# Patient Record
Sex: MALE | Race: WHITE | NOT HISPANIC OR LATINO
[De-identification: names, ages, dates, MRNs, and addresses within clinical notes are randomized per-mention and may not be internally consistent; named-entity substitution may affect disease eponyms.]

---

## 2017-03-27 ENCOUNTER — APPOINTMENT (OUTPATIENT)
Dept: UROLOGY | Facility: CLINIC | Age: 62
End: 2017-03-27

## 2017-03-27 VITALS
WEIGHT: 194 LBS | HEART RATE: 83 BPM | DIASTOLIC BLOOD PRESSURE: 87 MMHG | BODY MASS INDEX: 29.4 KG/M2 | TEMPERATURE: 98.6 F | SYSTOLIC BLOOD PRESSURE: 131 MMHG | HEIGHT: 68 IN | OXYGEN SATURATION: 98 %

## 2017-04-07 ENCOUNTER — MEDICATION RENEWAL (OUTPATIENT)
Age: 62
End: 2017-04-07

## 2017-04-12 ENCOUNTER — RX RENEWAL (OUTPATIENT)
Age: 62
End: 2017-04-12

## 2017-04-24 LAB — PSA SERPL-MCNC: 1

## 2017-05-25 ENCOUNTER — MEDICATION RENEWAL (OUTPATIENT)
Age: 62
End: 2017-05-25

## 2017-05-30 ENCOUNTER — MEDICATION RENEWAL (OUTPATIENT)
Age: 62
End: 2017-05-30

## 2017-06-12 ENCOUNTER — APPOINTMENT (OUTPATIENT)
Dept: INTERNAL MEDICINE | Facility: CLINIC | Age: 62
End: 2017-06-12

## 2017-06-21 ENCOUNTER — OTHER (OUTPATIENT)
Age: 62
End: 2017-06-21

## 2017-06-21 LAB
CHOLEST SERPL-MCNC: 179
GLUCOSE SERPL-MCNC: 89
HDLC SERPL-MCNC: 65
LDLC SERPL CALC-MCNC: 103

## 2017-06-26 ENCOUNTER — APPOINTMENT (OUTPATIENT)
Dept: INTERNAL MEDICINE | Facility: CLINIC | Age: 62
End: 2017-06-26

## 2017-06-26 VITALS
HEART RATE: 86 BPM | OXYGEN SATURATION: 97 % | WEIGHT: 196 LBS | BODY MASS INDEX: 29.7 KG/M2 | HEIGHT: 68 IN | SYSTOLIC BLOOD PRESSURE: 126 MMHG | RESPIRATION RATE: 16 BRPM | DIASTOLIC BLOOD PRESSURE: 80 MMHG | TEMPERATURE: 98 F

## 2017-06-26 DIAGNOSIS — K58.1 IRRITABLE BOWEL SYNDROME WITH CONSTIPATION: ICD-10-CM

## 2017-06-26 DIAGNOSIS — F33.1 MAJOR DEPRESSIVE DISORDER, RECURRENT, MODERATE: ICD-10-CM

## 2017-06-26 DIAGNOSIS — N40.0 BENIGN PROSTATIC HYPERPLASIA WITHOUT LOWER URINARY TRACT SYMPMS: ICD-10-CM

## 2017-08-09 ENCOUNTER — MEDICATION RENEWAL (OUTPATIENT)
Age: 62
End: 2017-08-09

## 2017-08-28 ENCOUNTER — APPOINTMENT (OUTPATIENT)
Dept: INTERNAL MEDICINE | Facility: CLINIC | Age: 62
End: 2017-08-28
Payer: MEDICAID

## 2017-08-28 VITALS
DIASTOLIC BLOOD PRESSURE: 80 MMHG | TEMPERATURE: 98.4 F | BODY MASS INDEX: 30.77 KG/M2 | OXYGEN SATURATION: 98 % | SYSTOLIC BLOOD PRESSURE: 134 MMHG | WEIGHT: 203 LBS | HEIGHT: 68 IN | HEART RATE: 90 BPM | RESPIRATION RATE: 16 BRPM

## 2017-08-28 DIAGNOSIS — R74.0 NONSPECIFIC ELEVATION OF LEVELS OF TRANSAMINASE AND LACTIC ACID DEHYDROGENASE [LDH]: ICD-10-CM

## 2017-08-28 PROCEDURE — 99214 OFFICE O/P EST MOD 30 MIN: CPT

## 2017-09-25 ENCOUNTER — RX RENEWAL (OUTPATIENT)
Age: 62
End: 2017-09-25

## 2017-11-20 ENCOUNTER — RX RENEWAL (OUTPATIENT)
Age: 62
End: 2017-11-20

## 2017-12-18 ENCOUNTER — RESULT REVIEW (OUTPATIENT)
Age: 62
End: 2017-12-18

## 2017-12-26 ENCOUNTER — OUTPATIENT (OUTPATIENT)
Dept: OUTPATIENT SERVICES | Facility: HOSPITAL | Age: 62
LOS: 1 days | Discharge: HOME | End: 2017-12-26
Payer: MEDICAID

## 2017-12-26 DIAGNOSIS — K44.9 DIAPHRAGMATIC HERNIA WITHOUT OBSTRUCTION OR GANGRENE: ICD-10-CM

## 2017-12-26 PROCEDURE — 74176 CT ABD & PELVIS W/O CONTRAST: CPT | Mod: 26

## 2018-01-08 ENCOUNTER — APPOINTMENT (OUTPATIENT)
Dept: INTERNAL MEDICINE | Facility: CLINIC | Age: 63
End: 2018-01-08
Payer: MEDICAID

## 2018-01-08 VITALS
HEIGHT: 68 IN | DIASTOLIC BLOOD PRESSURE: 62 MMHG | RESPIRATION RATE: 16 BRPM | SYSTOLIC BLOOD PRESSURE: 108 MMHG | BODY MASS INDEX: 31.07 KG/M2 | TEMPERATURE: 97.8 F | OXYGEN SATURATION: 98 % | WEIGHT: 205 LBS | HEART RATE: 78 BPM

## 2018-01-08 DIAGNOSIS — F41.9 ANXIETY DISORDER, UNSPECIFIED: ICD-10-CM

## 2018-01-08 DIAGNOSIS — M19.90 UNSPECIFIED OSTEOARTHRITIS, UNSPECIFIED SITE: ICD-10-CM

## 2018-01-08 DIAGNOSIS — Z86.79 PERSONAL HISTORY OF OTHER DISEASES OF THE CIRCULATORY SYSTEM: ICD-10-CM

## 2018-01-08 DIAGNOSIS — N52.9 MALE ERECTILE DYSFUNCTION, UNSPECIFIED: ICD-10-CM

## 2018-01-08 PROCEDURE — 99214 OFFICE O/P EST MOD 30 MIN: CPT

## 2018-01-08 RX ORDER — CLOMIPRAMINE HYDROCHLORIDE 25 MG/1
25 CAPSULE ORAL
Qty: 30 | Refills: 0 | Status: ACTIVE | COMMUNITY
Start: 2017-07-07

## 2018-01-24 ENCOUNTER — OUTPATIENT (OUTPATIENT)
Dept: OUTPATIENT SERVICES | Facility: HOSPITAL | Age: 63
LOS: 1 days | Discharge: HOME | End: 2018-01-24
Payer: MEDICAID

## 2018-01-24 DIAGNOSIS — K44.9 DIAPHRAGMATIC HERNIA WITHOUT OBSTRUCTION OR GANGRENE: ICD-10-CM

## 2018-01-24 LAB
BUN SERPL-MCNC: 20 MG/DL — SIGNIFICANT CHANGE UP (ref 7–23)
CREAT SERPL-MCNC: 0.89 MG/DL — SIGNIFICANT CHANGE UP (ref 0.5–1.3)

## 2018-01-24 PROCEDURE — 74177 CT ABD & PELVIS W/CONTRAST: CPT | Mod: 26

## 2018-01-25 ENCOUNTER — OTHER (OUTPATIENT)
Age: 63
End: 2018-01-25

## 2018-01-25 LAB
CHOLEST SERPL-MCNC: 166
GLUCOSE SERPL-MCNC: 117
HDLC SERPL-MCNC: 49
LDLC SERPL CALC-MCNC: 78

## 2018-02-01 ENCOUNTER — APPOINTMENT (OUTPATIENT)
Dept: SURGERY | Facility: CLINIC | Age: 63
End: 2018-02-01

## 2018-02-08 ENCOUNTER — APPOINTMENT (OUTPATIENT)
Dept: SURGERY | Facility: CLINIC | Age: 63
End: 2018-02-08

## 2018-02-12 ENCOUNTER — APPOINTMENT (OUTPATIENT)
Dept: ORTHOPEDIC SURGERY | Facility: CLINIC | Age: 63
End: 2018-02-12
Payer: MEDICAID

## 2018-02-12 VITALS
DIASTOLIC BLOOD PRESSURE: 79 MMHG | HEIGHT: 68 IN | WEIGHT: 205 LBS | SYSTOLIC BLOOD PRESSURE: 135 MMHG | HEART RATE: 83 BPM | BODY MASS INDEX: 31.07 KG/M2

## 2018-02-12 PROCEDURE — 99204 OFFICE O/P NEW MOD 45 MIN: CPT

## 2018-02-27 ENCOUNTER — RX RENEWAL (OUTPATIENT)
Age: 63
End: 2018-02-27

## 2018-03-02 ENCOUNTER — RX RENEWAL (OUTPATIENT)
Age: 63
End: 2018-03-02

## 2018-03-07 ENCOUNTER — APPOINTMENT (OUTPATIENT)
Dept: SURGERY | Facility: CLINIC | Age: 63
End: 2018-03-07
Payer: MEDICAID

## 2018-03-07 VITALS
TEMPERATURE: 98.1 F | WEIGHT: 199 LBS | OXYGEN SATURATION: 98 % | SYSTOLIC BLOOD PRESSURE: 130 MMHG | HEIGHT: 68 IN | RESPIRATION RATE: 15 BRPM | DIASTOLIC BLOOD PRESSURE: 76 MMHG | BODY MASS INDEX: 30.16 KG/M2 | HEART RATE: 90 BPM

## 2018-03-07 DIAGNOSIS — Z80.0 FAMILY HISTORY OF MALIGNANT NEOPLASM OF DIGESTIVE ORGANS: ICD-10-CM

## 2018-03-07 DIAGNOSIS — E78.00 PURE HYPERCHOLESTEROLEMIA, UNSPECIFIED: ICD-10-CM

## 2018-03-07 DIAGNOSIS — Z80.1 FAMILY HISTORY OF MALIGNANT NEOPLASM OF TRACHEA, BRONCHUS AND LUNG: ICD-10-CM

## 2018-03-07 DIAGNOSIS — K42.9 UMBILICAL HERNIA W/OUT OBSTRUCTION OR GANGRENE: ICD-10-CM

## 2018-03-07 DIAGNOSIS — Z78.9 OTHER SPECIFIED HEALTH STATUS: ICD-10-CM

## 2018-03-07 PROCEDURE — 99204 OFFICE O/P NEW MOD 45 MIN: CPT

## 2018-03-07 RX ORDER — CHOLECALCIFEROL (VITAMIN D3) 25 MCG
TABLET ORAL
Refills: 0 | Status: ACTIVE | COMMUNITY

## 2018-03-07 RX ORDER — POLYETHYLENE GLYCOL-3350, SODIUM CHLORIDE, POTASSIUM CHLORIDE AND SODIUM BICARBONATE 420; 11.2; 5.72; 1.48 G/438.4G; G/438.4G; G/438.4G; G/438.4G
420 POWDER, FOR SOLUTION ORAL
Qty: 4000 | Refills: 0 | Status: DISCONTINUED | COMMUNITY
Start: 2017-08-21

## 2018-03-07 RX ORDER — OMEPRAZOLE 20 MG/1
20 CAPSULE, DELAYED RELEASE ORAL DAILY
Qty: 30 | Refills: 5 | Status: DISCONTINUED | COMMUNITY
End: 2018-03-07

## 2018-03-07 RX ORDER — BUSPIRONE HYDROCHLORIDE 10 MG/1
10 TABLET ORAL
Qty: 60 | Refills: 0 | Status: DISCONTINUED | COMMUNITY
Start: 2017-10-23

## 2018-03-07 RX ORDER — SILDENAFIL 20 MG/1
20 TABLET ORAL
Qty: 50 | Refills: 11 | Status: DISCONTINUED | COMMUNITY
Start: 2017-03-27 | End: 2018-03-07

## 2018-03-07 RX ORDER — BUSPIRONE HYDROCHLORIDE 15 MG/1
15 TABLET ORAL
Qty: 90 | Refills: 0 | Status: DISCONTINUED | COMMUNITY
Start: 2017-11-10

## 2018-03-07 RX ORDER — ASCORBIC ACID 500 MG
TABLET ORAL
Refills: 0 | Status: ACTIVE | COMMUNITY

## 2018-03-07 RX ORDER — FLUOXETINE HYDROCHLORIDE 20 MG/1
20 CAPSULE ORAL
Qty: 90 | Refills: 0 | Status: DISCONTINUED | COMMUNITY
Start: 2017-07-18 | End: 2018-03-07

## 2018-03-07 RX ORDER — IBUPROFEN 800 MG/1
800 TABLET, FILM COATED ORAL
Qty: 60 | Refills: 3 | Status: DISCONTINUED | COMMUNITY
End: 2018-03-07

## 2018-03-07 RX ORDER — FEXOFENADINE HCL 180 MG
180 TABLET ORAL
Refills: 0 | Status: ACTIVE | COMMUNITY

## 2018-03-09 PROBLEM — M25.512 LEFT SHOULDER PAIN: Status: ACTIVE | Noted: 2018-03-09

## 2018-03-12 ENCOUNTER — APPOINTMENT (OUTPATIENT)
Dept: ORTHOPEDIC SURGERY | Facility: CLINIC | Age: 63
End: 2018-03-12
Payer: MEDICAID

## 2018-03-12 VITALS
HEART RATE: 79 BPM | BODY MASS INDEX: 30.77 KG/M2 | WEIGHT: 203 LBS | HEIGHT: 68 IN | DIASTOLIC BLOOD PRESSURE: 84 MMHG | SYSTOLIC BLOOD PRESSURE: 141 MMHG

## 2018-03-12 DIAGNOSIS — M25.512 PAIN IN LEFT SHOULDER: ICD-10-CM

## 2018-03-12 DIAGNOSIS — M75.52 BURSITIS OF LEFT SHOULDER: ICD-10-CM

## 2018-03-12 PROCEDURE — 99215 OFFICE O/P EST HI 40 MIN: CPT | Mod: 25

## 2018-03-12 PROCEDURE — 73030 X-RAY EXAM OF SHOULDER: CPT | Mod: LT

## 2018-03-12 PROCEDURE — 20610 DRAIN/INJ JOINT/BURSA W/O US: CPT | Mod: LT

## 2018-03-15 ENCOUNTER — APPOINTMENT (OUTPATIENT)
Dept: PHYSICAL MEDICINE AND REHAB | Facility: CLINIC | Age: 63
End: 2018-03-15
Payer: MEDICAID

## 2018-03-15 VITALS
HEIGHT: 68 IN | WEIGHT: 203 LBS | HEART RATE: 79 BPM | SYSTOLIC BLOOD PRESSURE: 154 MMHG | BODY MASS INDEX: 30.77 KG/M2 | DIASTOLIC BLOOD PRESSURE: 86 MMHG

## 2018-03-15 PROCEDURE — 99204 OFFICE O/P NEW MOD 45 MIN: CPT

## 2018-03-16 ENCOUNTER — FORM ENCOUNTER (OUTPATIENT)
Age: 63
End: 2018-03-16

## 2018-03-17 ENCOUNTER — OUTPATIENT (OUTPATIENT)
Dept: OUTPATIENT SERVICES | Facility: HOSPITAL | Age: 63
LOS: 1 days | End: 2018-03-17
Payer: MEDICAID

## 2018-03-17 ENCOUNTER — APPOINTMENT (OUTPATIENT)
Dept: MRI IMAGING | Facility: CLINIC | Age: 63
End: 2018-03-17
Payer: MEDICAID

## 2018-03-17 DIAGNOSIS — M54.16 RADICULOPATHY, LUMBAR REGION: ICD-10-CM

## 2018-03-17 DIAGNOSIS — M53.3 SACROCOCCYGEAL DISORDERS, NOT ELSEWHERE CLASSIFIED: ICD-10-CM

## 2018-03-17 PROCEDURE — 72148 MRI LUMBAR SPINE W/O DYE: CPT

## 2018-03-17 PROCEDURE — 72148 MRI LUMBAR SPINE W/O DYE: CPT | Mod: 26

## 2018-03-20 ENCOUNTER — TRANSCRIPTION ENCOUNTER (OUTPATIENT)
Age: 63
End: 2018-03-20

## 2018-03-23 ENCOUNTER — APPOINTMENT (OUTPATIENT)
Dept: PHYSICAL MEDICINE AND REHAB | Facility: CLINIC | Age: 63
End: 2018-03-23
Payer: MEDICAID

## 2018-03-23 VITALS
BODY MASS INDEX: 30.77 KG/M2 | HEIGHT: 68 IN | OXYGEN SATURATION: 98 % | DIASTOLIC BLOOD PRESSURE: 99 MMHG | SYSTOLIC BLOOD PRESSURE: 164 MMHG | HEART RATE: 109 BPM | WEIGHT: 203 LBS

## 2018-03-23 PROCEDURE — 99213 OFFICE O/P EST LOW 20 MIN: CPT

## 2018-04-09 ENCOUNTER — APPOINTMENT (OUTPATIENT)
Dept: ORTHOPEDIC SURGERY | Facility: CLINIC | Age: 63
End: 2018-04-09
Payer: MEDICAID

## 2018-04-09 ENCOUNTER — APPOINTMENT (OUTPATIENT)
Dept: PHYSICAL MEDICINE AND REHAB | Facility: CLINIC | Age: 63
End: 2018-04-09

## 2018-04-09 VITALS
BODY MASS INDEX: 30.77 KG/M2 | HEIGHT: 68 IN | HEART RATE: 89 BPM | SYSTOLIC BLOOD PRESSURE: 150 MMHG | WEIGHT: 203 LBS | DIASTOLIC BLOOD PRESSURE: 84 MMHG

## 2018-04-09 DIAGNOSIS — M75.32 CALCIFIC TENDINITIS OF LEFT SHOULDER: ICD-10-CM

## 2018-04-09 PROCEDURE — 99214 OFFICE O/P EST MOD 30 MIN: CPT

## 2018-04-13 ENCOUNTER — FORM ENCOUNTER (OUTPATIENT)
Age: 63
End: 2018-04-13

## 2018-04-14 ENCOUNTER — OUTPATIENT (OUTPATIENT)
Dept: OUTPATIENT SERVICES | Facility: HOSPITAL | Age: 63
LOS: 1 days | End: 2018-04-14
Payer: MEDICAID

## 2018-04-14 ENCOUNTER — APPOINTMENT (OUTPATIENT)
Dept: MRI IMAGING | Facility: CLINIC | Age: 63
End: 2018-04-14
Payer: MEDICAID

## 2018-04-14 DIAGNOSIS — Z00.8 ENCOUNTER FOR OTHER GENERAL EXAMINATION: ICD-10-CM

## 2018-04-14 PROCEDURE — 73221 MRI JOINT UPR EXTREM W/O DYE: CPT

## 2018-04-14 PROCEDURE — 73221 MRI JOINT UPR EXTREM W/O DYE: CPT | Mod: 26,LT

## 2018-04-23 ENCOUNTER — APPOINTMENT (OUTPATIENT)
Dept: ORTHOPEDIC SURGERY | Facility: CLINIC | Age: 63
End: 2018-04-23
Payer: MEDICAID

## 2018-04-23 DIAGNOSIS — M67.912 UNSPECIFIED DISORDER OF SYNOVIUM AND TENDON, LEFT SHOULDER: ICD-10-CM

## 2018-04-23 DIAGNOSIS — M19.012 PRIMARY OSTEOARTHRITIS, LEFT SHOULDER: ICD-10-CM

## 2018-04-23 PROCEDURE — 99213 OFFICE O/P EST LOW 20 MIN: CPT

## 2018-05-08 ENCOUNTER — OUTPATIENT (OUTPATIENT)
Dept: OUTPATIENT SERVICES | Facility: HOSPITAL | Age: 63
LOS: 1 days | End: 2018-05-08
Payer: MEDICAID

## 2018-05-08 ENCOUNTER — APPOINTMENT (OUTPATIENT)
Dept: PHYSICAL MEDICINE AND REHAB | Facility: CLINIC | Age: 63
End: 2018-05-08
Payer: MEDICAID

## 2018-05-08 DIAGNOSIS — M54.17 RADICULOPATHY, LUMBOSACRAL REGION: ICD-10-CM

## 2018-05-08 DIAGNOSIS — M54.16 RADICULOPATHY, LUMBAR REGION: ICD-10-CM

## 2018-05-08 PROCEDURE — 62323 NJX INTERLAMINAR LMBR/SAC: CPT

## 2018-05-18 ENCOUNTER — MEDICATION RENEWAL (OUTPATIENT)
Age: 63
End: 2018-05-18

## 2018-05-21 ENCOUNTER — RX RENEWAL (OUTPATIENT)
Age: 63
End: 2018-05-21

## 2018-05-24 ENCOUNTER — APPOINTMENT (OUTPATIENT)
Dept: PHYSICAL MEDICINE AND REHAB | Facility: CLINIC | Age: 63
End: 2018-05-24

## 2018-06-04 ENCOUNTER — MEDICATION RENEWAL (OUTPATIENT)
Age: 63
End: 2018-06-04

## 2018-06-15 ENCOUNTER — APPOINTMENT (OUTPATIENT)
Dept: ORTHOPEDIC SURGERY | Facility: CLINIC | Age: 63
End: 2018-06-15
Payer: MEDICAID

## 2018-06-15 DIAGNOSIS — M25.562 PAIN IN LEFT KNEE: ICD-10-CM

## 2018-06-15 PROCEDURE — 73562 X-RAY EXAM OF KNEE 3: CPT | Mod: LT

## 2018-06-15 PROCEDURE — 99213 OFFICE O/P EST LOW 20 MIN: CPT

## 2018-06-15 RX ORDER — IBUPROFEN 800 MG/1
800 TABLET, FILM COATED ORAL
Qty: 60 | Refills: 1 | Status: ACTIVE | COMMUNITY
Start: 2018-06-15 | End: 1900-01-01

## 2018-06-22 ENCOUNTER — APPOINTMENT (OUTPATIENT)
Dept: PHYSICAL MEDICINE AND REHAB | Facility: CLINIC | Age: 63
End: 2018-06-22
Payer: MEDICAID

## 2018-06-22 DIAGNOSIS — M54.5 LOW BACK PAIN: ICD-10-CM

## 2018-06-22 PROCEDURE — 99213 OFFICE O/P EST LOW 20 MIN: CPT

## 2018-06-26 VITALS — HEIGHT: 68 IN | RESPIRATION RATE: 14 BRPM | HEART RATE: 90 BPM | WEIGHT: 203 LBS | BODY MASS INDEX: 30.77 KG/M2

## 2018-06-26 PROBLEM — M54.5 LOW BACK PAIN: Status: ACTIVE | Noted: 2018-02-12

## 2018-06-28 ENCOUNTER — APPOINTMENT (OUTPATIENT)
Dept: INTERNAL MEDICINE | Facility: CLINIC | Age: 63
End: 2018-06-28
Payer: MEDICAID

## 2018-06-28 VITALS
RESPIRATION RATE: 16 BRPM | DIASTOLIC BLOOD PRESSURE: 90 MMHG | BODY MASS INDEX: 30.77 KG/M2 | OXYGEN SATURATION: 96 % | HEART RATE: 102 BPM | SYSTOLIC BLOOD PRESSURE: 136 MMHG | HEIGHT: 68 IN | WEIGHT: 203 LBS | TEMPERATURE: 98.6 F

## 2018-06-28 DIAGNOSIS — Z87.39 PERSONAL HISTORY OF OTHER DISEASES OF THE MUSCULOSKELETAL SYSTEM AND CONNECTIVE TISSUE: ICD-10-CM

## 2018-06-28 DIAGNOSIS — R74.0 NONSPECIFIC ELEVATION OF LEVELS OF TRANSAMINASE AND LACTIC ACID DEHYDROGENASE [LDH]: ICD-10-CM

## 2018-06-28 DIAGNOSIS — Z86.39 PERSONAL HISTORY OF OTHER ENDOCRINE, NUTRITIONAL AND METABOLIC DISEASE: ICD-10-CM

## 2018-06-28 DIAGNOSIS — Z87.09 PERSONAL HISTORY OF OTHER DISEASES OF THE RESPIRATORY SYSTEM: ICD-10-CM

## 2018-06-28 DIAGNOSIS — K21.9 GASTRO-ESOPHAGEAL REFLUX DISEASE W/OUT ESOPHAGITIS: ICD-10-CM

## 2018-06-28 PROCEDURE — 99214 OFFICE O/P EST MOD 30 MIN: CPT

## 2018-06-28 RX ORDER — BUSPIRONE HYDROCHLORIDE 30 MG/1
30 TABLET ORAL
Qty: 60 | Refills: 0 | Status: DISCONTINUED | COMMUNITY
Start: 2017-12-04 | End: 2018-06-28

## 2018-06-28 RX ORDER — HYALURONATE SODIUM 20 MG/2 ML
20 SYRINGE (ML) INTRAARTICULAR
Qty: 1 | Refills: 0 | Status: DISCONTINUED | OUTPATIENT
Start: 2018-06-15 | End: 2018-06-28

## 2018-06-28 RX ORDER — BUPROPION HYDROCHLORIDE 150 MG/1
150 TABLET, EXTENDED RELEASE ORAL
Qty: 30 | Refills: 0 | Status: DISCONTINUED | COMMUNITY
Start: 2018-04-16 | End: 2018-06-28

## 2018-06-28 RX ORDER — SENNOSIDES 15 MG/1
TABLET, COATED ORAL
Refills: 0 | Status: ACTIVE | COMMUNITY

## 2018-06-28 RX ORDER — FLUVOXAMINE MALEATE 100 MG/1
100 TABLET, FILM COATED ORAL
Refills: 0 | Status: ACTIVE | COMMUNITY

## 2018-06-28 RX ORDER — IBUPROFEN 800 MG/1
800 TABLET, FILM COATED ORAL
Refills: 0 | Status: DISCONTINUED | COMMUNITY
End: 2018-06-28

## 2018-06-28 RX ORDER — BUSPIRONE HYDROCHLORIDE 30 MG/1
30 TABLET ORAL
Refills: 0 | Status: ACTIVE | COMMUNITY

## 2018-06-28 RX ORDER — ALBUTEROL SULFATE 90 UG/1
108 (90 BASE) AEROSOL, METERED RESPIRATORY (INHALATION)
Qty: 18 | Refills: 0 | Status: DISCONTINUED | COMMUNITY
Start: 2018-06-11 | End: 2018-06-28

## 2018-06-28 NOTE — HISTORY OF PRESENT ILLNESS
[FreeTextEntry1] : followup evaluation [de-identified] : Frank presents for followup evaluation. Feeling well. Denies any chest pain, shortness of breath or palpitations. No nocturnal symptoms of cough and dyspnea. He denies any hematuria dysuria. He continues on current medication regimen without any untoward side effects. Transferred to

## 2018-06-28 NOTE — PLAN
[FreeTextEntry1] : Frank will continue on her medication regimen. He has been given a prescription for comprehensive blood profile. We'll followup in 6 months for complete physical examination.

## 2018-07-09 ENCOUNTER — APPOINTMENT (OUTPATIENT)
Dept: ORTHOPEDIC SURGERY | Facility: CLINIC | Age: 63
End: 2018-07-09
Payer: MEDICAID

## 2018-07-09 ENCOUNTER — RX RENEWAL (OUTPATIENT)
Age: 63
End: 2018-07-09

## 2018-07-09 PROCEDURE — 20610 DRAIN/INJ JOINT/BURSA W/O US: CPT | Mod: LT

## 2018-07-16 ENCOUNTER — APPOINTMENT (OUTPATIENT)
Dept: ORTHOPEDIC SURGERY | Facility: CLINIC | Age: 63
End: 2018-07-16
Payer: MEDICAID

## 2018-07-16 PROCEDURE — 20610 DRAIN/INJ JOINT/BURSA W/O US: CPT | Mod: LT

## 2018-07-23 ENCOUNTER — APPOINTMENT (OUTPATIENT)
Dept: ORTHOPEDIC SURGERY | Facility: CLINIC | Age: 63
End: 2018-07-23

## 2018-07-31 ENCOUNTER — OUTPATIENT (OUTPATIENT)
Dept: OUTPATIENT SERVICES | Facility: HOSPITAL | Age: 63
LOS: 1 days | End: 2018-07-31
Payer: MEDICAID

## 2018-07-31 ENCOUNTER — APPOINTMENT (OUTPATIENT)
Dept: PHYSICAL MEDICINE AND REHAB | Facility: CLINIC | Age: 63
End: 2018-07-31
Payer: MEDICAID

## 2018-07-31 DIAGNOSIS — M54.18 RADICULOPATHY, SACRAL AND SACROCOCCYGEAL REGION: ICD-10-CM

## 2018-07-31 PROCEDURE — 62323 NJX INTERLAMINAR LMBR/SAC: CPT

## 2018-08-10 ENCOUNTER — APPOINTMENT (OUTPATIENT)
Dept: PHYSICAL MEDICINE AND REHAB | Facility: CLINIC | Age: 63
End: 2018-08-10
Payer: MEDICAID

## 2018-08-10 VITALS
BODY MASS INDEX: 30.77 KG/M2 | HEART RATE: 91 BPM | SYSTOLIC BLOOD PRESSURE: 122 MMHG | DIASTOLIC BLOOD PRESSURE: 81 MMHG | WEIGHT: 203 LBS | HEIGHT: 68 IN

## 2018-08-10 PROCEDURE — 99213 OFFICE O/P EST LOW 20 MIN: CPT

## 2018-08-13 ENCOUNTER — RX RENEWAL (OUTPATIENT)
Age: 63
End: 2018-08-13

## 2018-08-21 ENCOUNTER — RX RENEWAL (OUTPATIENT)
Age: 63
End: 2018-08-21

## 2018-08-21 DIAGNOSIS — T75.3XXA MOTION SICKNESS, INITIAL ENCOUNTER: ICD-10-CM

## 2018-08-21 RX ORDER — SCOPOLAMINE 1.5 MG/1
1 PATCH, EXTENDED RELEASE TRANSDERMAL
Qty: 3 | Refills: 0 | Status: ACTIVE | COMMUNITY
Start: 2018-08-21 | End: 1900-01-01

## 2018-08-29 ENCOUNTER — RX RENEWAL (OUTPATIENT)
Age: 63
End: 2018-08-29

## 2018-08-30 ENCOUNTER — RX RENEWAL (OUTPATIENT)
Age: 63
End: 2018-08-30

## 2018-10-03 ENCOUNTER — RX RENEWAL (OUTPATIENT)
Age: 63
End: 2018-10-03

## 2018-10-03 ENCOUNTER — MEDICATION RENEWAL (OUTPATIENT)
Age: 63
End: 2018-10-03

## 2018-10-03 RX ORDER — FLUTICASONE PROPIONATE 50 UG/1
50 SPRAY, METERED NASAL DAILY
Qty: 48 | Refills: 1 | Status: ACTIVE | COMMUNITY
Start: 2018-10-03 | End: 1900-01-01

## 2018-10-04 ENCOUNTER — APPOINTMENT (OUTPATIENT)
Dept: PHYSICAL MEDICINE AND REHAB | Facility: CLINIC | Age: 63
End: 2018-10-04

## 2018-10-04 VITALS
DIASTOLIC BLOOD PRESSURE: 95 MMHG | SYSTOLIC BLOOD PRESSURE: 153 MMHG | HEIGHT: 68 IN | WEIGHT: 203 LBS | BODY MASS INDEX: 30.77 KG/M2 | HEART RATE: 84 BPM

## 2018-10-11 ENCOUNTER — APPOINTMENT (OUTPATIENT)
Dept: PHYSICAL MEDICINE AND REHAB | Facility: CLINIC | Age: 63
End: 2018-10-11

## 2018-10-11 ENCOUNTER — APPOINTMENT (OUTPATIENT)
Dept: PHYSICAL MEDICINE AND REHAB | Facility: CLINIC | Age: 63
End: 2018-10-11
Payer: MEDICAID

## 2018-10-11 VITALS
HEART RATE: 82 BPM | HEIGHT: 68 IN | DIASTOLIC BLOOD PRESSURE: 83 MMHG | WEIGHT: 203 LBS | BODY MASS INDEX: 30.77 KG/M2 | SYSTOLIC BLOOD PRESSURE: 138 MMHG

## 2018-10-11 DIAGNOSIS — M54.16 RADICULOPATHY, LUMBAR REGION: ICD-10-CM

## 2018-10-11 PROCEDURE — 99214 OFFICE O/P EST MOD 30 MIN: CPT

## 2018-10-15 PROBLEM — M54.16 LUMBAR RADICULOPATHY: Status: ACTIVE | Noted: 2018-03-15

## 2018-10-24 ENCOUNTER — FORM ENCOUNTER (OUTPATIENT)
Age: 63
End: 2018-10-24

## 2018-10-25 ENCOUNTER — APPOINTMENT (OUTPATIENT)
Dept: MRI IMAGING | Facility: CLINIC | Age: 63
End: 2018-10-25
Payer: MEDICAID

## 2018-10-25 ENCOUNTER — OUTPATIENT (OUTPATIENT)
Dept: OUTPATIENT SERVICES | Facility: HOSPITAL | Age: 63
LOS: 1 days | End: 2018-10-25
Payer: MEDICAID

## 2018-10-25 DIAGNOSIS — Z00.8 ENCOUNTER FOR OTHER GENERAL EXAMINATION: ICD-10-CM

## 2018-10-25 PROCEDURE — 72148 MRI LUMBAR SPINE W/O DYE: CPT | Mod: 26

## 2018-10-25 PROCEDURE — 72148 MRI LUMBAR SPINE W/O DYE: CPT

## 2018-10-26 ENCOUNTER — MOBILE ON CALL (OUTPATIENT)
Age: 63
End: 2018-10-26

## 2018-11-14 ENCOUNTER — RX RENEWAL (OUTPATIENT)
Age: 63
End: 2018-11-14

## 2018-11-14 RX ORDER — IBUPROFEN 600 MG/1
600 TABLET, FILM COATED ORAL 3 TIMES DAILY
Qty: 60 | Refills: 0 | Status: ACTIVE | COMMUNITY
Start: 2018-11-14 | End: 1900-01-01

## 2018-11-27 ENCOUNTER — APPOINTMENT (OUTPATIENT)
Dept: PHYSICAL MEDICINE AND REHAB | Facility: CLINIC | Age: 63
End: 2018-11-27
Payer: MEDICAID

## 2018-11-27 ENCOUNTER — OUTPATIENT (OUTPATIENT)
Dept: OUTPATIENT SERVICES | Facility: HOSPITAL | Age: 63
LOS: 1 days | End: 2018-11-27
Payer: MEDICAID

## 2018-11-27 DIAGNOSIS — M54.16 RADICULOPATHY, LUMBAR REGION: ICD-10-CM

## 2018-11-27 DIAGNOSIS — M46.1 SACROILIITIS, NOT ELSEWHERE CLASSIFIED: ICD-10-CM

## 2018-11-27 PROCEDURE — G0260: CPT

## 2018-11-27 PROCEDURE — 27096 INJECT SACROILIAC JOINT: CPT

## 2018-11-30 ENCOUNTER — APPOINTMENT (OUTPATIENT)
Dept: PHYSICAL MEDICINE AND REHAB | Facility: CLINIC | Age: 63
End: 2018-11-30

## 2018-12-13 LAB — HBA1C MFR BLD HPLC: 5.3

## 2018-12-14 ENCOUNTER — APPOINTMENT (OUTPATIENT)
Dept: PHYSICAL MEDICINE AND REHAB | Facility: CLINIC | Age: 63
End: 2018-12-14
Payer: MEDICAID

## 2018-12-14 VITALS
DIASTOLIC BLOOD PRESSURE: 88 MMHG | HEIGHT: 68 IN | WEIGHT: 203 LBS | SYSTOLIC BLOOD PRESSURE: 142 MMHG | BODY MASS INDEX: 30.77 KG/M2

## 2018-12-14 DIAGNOSIS — M53.3 SACROCOCCYGEAL DISORDERS, NOT ELSEWHERE CLASSIFIED: ICD-10-CM

## 2018-12-14 PROCEDURE — 99213 OFFICE O/P EST LOW 20 MIN: CPT

## 2018-12-15 PROBLEM — M53.3 COCCYXDYNIA: Status: ACTIVE | Noted: 2018-03-15

## 2018-12-15 PROBLEM — M53.3 SACROILIAC DYSFUNCTION: Status: ACTIVE | Noted: 2018-10-11

## 2018-12-20 ENCOUNTER — APPOINTMENT (OUTPATIENT)
Dept: INTERNAL MEDICINE | Facility: CLINIC | Age: 63
End: 2018-12-20
Payer: MEDICAID

## 2018-12-20 ENCOUNTER — OTHER (OUTPATIENT)
Age: 63
End: 2018-12-20

## 2018-12-20 VITALS
RESPIRATION RATE: 18 BRPM | HEIGHT: 68 IN | SYSTOLIC BLOOD PRESSURE: 122 MMHG | BODY MASS INDEX: 30.62 KG/M2 | DIASTOLIC BLOOD PRESSURE: 86 MMHG | HEART RATE: 101 BPM | OXYGEN SATURATION: 95 % | TEMPERATURE: 98.5 F | WEIGHT: 202 LBS

## 2018-12-20 DIAGNOSIS — F41.9 ANXIETY DISORDER, UNSPECIFIED: ICD-10-CM

## 2018-12-20 DIAGNOSIS — J30.2 OTHER ALLERGIC RHINITIS: ICD-10-CM

## 2018-12-20 DIAGNOSIS — K21.9 GASTRO-ESOPHAGEAL REFLUX DISEASE W/OUT ESOPHAGITIS: ICD-10-CM

## 2018-12-20 DIAGNOSIS — E78.5 HYPERLIPIDEMIA, UNSPECIFIED: ICD-10-CM

## 2018-12-20 DIAGNOSIS — Z60.2 PROBLEMS RELATED TO LIVING ALONE: ICD-10-CM

## 2018-12-20 DIAGNOSIS — J30.89 OTHER ALLERGIC RHINITIS: ICD-10-CM

## 2018-12-20 DIAGNOSIS — Z23 ENCOUNTER FOR IMMUNIZATION: ICD-10-CM

## 2018-12-20 DIAGNOSIS — F32.9 ANXIETY DISORDER, UNSPECIFIED: ICD-10-CM

## 2018-12-20 DIAGNOSIS — Z78.9 OTHER SPECIFIED HEALTH STATUS: ICD-10-CM

## 2018-12-20 PROCEDURE — G0009: CPT

## 2018-12-20 PROCEDURE — 90732 PPSV23 VACC 2 YRS+ SUBQ/IM: CPT | Mod: GA

## 2018-12-20 PROCEDURE — 99214 OFFICE O/P EST MOD 30 MIN: CPT | Mod: 25

## 2018-12-20 RX ORDER — AZELASTINE HYDROCHLORIDE 137 UG/1
0.1 SPRAY, METERED NASAL
Qty: 30 | Refills: 4 | Status: DISCONTINUED | COMMUNITY
Start: 2017-09-25 | End: 2018-12-20

## 2018-12-20 SDOH — SOCIAL STABILITY - SOCIAL INSECURITY: PROBLEMS RELATED TO LIVING ALONE: Z60.2

## 2018-12-20 NOTE — HISTORY OF PRESENT ILLNESS
[FreeTextEntry1] : Followup [de-identified] : Frank presents for followup evaluation. He is feeling well. He continues to have some symptoms of depression however he denies suicidal ideation. Patient denies any chest pain, shortness of breath palpitations. No hematuria or dysuria.

## 2018-12-20 NOTE — PLAN
[FreeTextEntry1] : Comprehensive blood profile was reviewed with patient. He will continue on current medication regimen. Patient will followup in 6 months with repeat lipid profile and complete metabolic profile. Frank will receive the pneumococcal vaccine.

## 2019-01-09 ENCOUNTER — RESULT REVIEW (OUTPATIENT)
Age: 64
End: 2019-01-09

## 2019-02-14 ENCOUNTER — RX RENEWAL (OUTPATIENT)
Age: 64
End: 2019-02-14

## 2019-02-21 ENCOUNTER — MEDICATION RENEWAL (OUTPATIENT)
Age: 64
End: 2019-02-21

## 2019-02-21 RX ORDER — OMEPRAZOLE 20 MG/1
20 CAPSULE, DELAYED RELEASE ORAL
Qty: 30 | Refills: 0 | Status: ACTIVE | COMMUNITY
Start: 2018-08-13 | End: 1900-01-01

## 2019-02-21 RX ORDER — AZELASTINE HYDROCHLORIDE 137 UG/1
137 SPRAY, METERED NASAL
Qty: 1 | Refills: 0 | Status: ACTIVE | COMMUNITY
Start: 2018-08-29 | End: 1900-01-01

## 2019-02-21 RX ORDER — SIMVASTATIN 40 MG/1
40 TABLET, FILM COATED ORAL
Qty: 30 | Refills: 0 | Status: ACTIVE | COMMUNITY
Start: 2017-09-20 | End: 1900-01-01

## 2019-06-28 ENCOUNTER — APPOINTMENT (OUTPATIENT)
Dept: INTERNAL MEDICINE | Facility: CLINIC | Age: 64
End: 2019-06-28

## 2019-07-23 ENCOUNTER — TRANSCRIPTION ENCOUNTER (OUTPATIENT)
Age: 64
End: 2019-07-23

## 2019-10-02 PROBLEM — Z60.2 PERSON LIVING ALONE: Status: ACTIVE | Noted: 2018-04-09

## 2020-08-06 ENCOUNTER — APPOINTMENT (OUTPATIENT)
Dept: DERMATOLOGY | Facility: CLINIC | Age: 65
End: 2020-08-06
Payer: COMMERCIAL

## 2020-08-06 VITALS — TEMPERATURE: 96.1 F

## 2020-08-06 DIAGNOSIS — Z87.2 PERSONAL HISTORY OF DISEASES OF THE SKIN AND SUBCUTANEOUS TISSUE: ICD-10-CM

## 2020-08-06 DIAGNOSIS — Z85.828 PERSONAL HISTORY OF OTHER MALIGNANT NEOPLASM OF SKIN: ICD-10-CM

## 2020-08-06 DIAGNOSIS — Z77.22 CONTACT WITH AND (SUSPECTED) EXPOSURE TO ENVIRONMENTAL TOBACCO SMOKE (ACUTE) (CHRONIC): ICD-10-CM

## 2020-08-06 PROCEDURE — 99202 OFFICE O/P NEW SF 15 MIN: CPT | Mod: GC

## 2020-08-06 RX ORDER — CLOBETASOL PROPIONATE 0.5 MG/G
0.05 OINTMENT TOPICAL
Qty: 1 | Refills: 0 | Status: ACTIVE | COMMUNITY
Start: 2020-08-06 | End: 1900-01-01

## 2020-08-10 PROBLEM — Z85.828 HISTORY OF MALIGNANT NEOPLASM OF SKIN: Status: INACTIVE | Noted: 2020-08-10

## 2020-08-10 PROBLEM — Z87.2 HISTORY OF ECZEMA: Status: INACTIVE | Noted: 2020-08-10

## 2020-08-10 PROBLEM — Z87.2 HISTORY OF PSORIASIS: Status: INACTIVE | Noted: 2020-08-10

## 2020-08-10 PROBLEM — Z77.22 SECONDHAND SMOKE EXPOSURE: Status: ACTIVE | Noted: 2020-08-06

## 2020-09-11 ENCOUNTER — APPOINTMENT (OUTPATIENT)
Dept: ORTHOPEDIC SURGERY | Facility: CLINIC | Age: 65
End: 2020-09-11
Payer: MEDICARE

## 2020-09-11 VITALS
BODY MASS INDEX: 31.07 KG/M2 | TEMPERATURE: 97.5 F | HEIGHT: 68 IN | SYSTOLIC BLOOD PRESSURE: 107 MMHG | WEIGHT: 205 LBS | HEART RATE: 88 BPM | DIASTOLIC BLOOD PRESSURE: 71 MMHG

## 2020-09-11 DIAGNOSIS — M16.12 UNILATERAL PRIMARY OSTEOARTHRITIS, LEFT HIP: ICD-10-CM

## 2020-09-11 PROCEDURE — 72170 X-RAY EXAM OF PELVIS: CPT

## 2020-09-11 PROCEDURE — 73562 X-RAY EXAM OF KNEE 3: CPT | Mod: LT

## 2020-09-11 PROCEDURE — 99213 OFFICE O/P EST LOW 20 MIN: CPT

## 2020-09-24 ENCOUNTER — APPOINTMENT (OUTPATIENT)
Dept: ORTHOPEDIC SURGERY | Facility: CLINIC | Age: 65
End: 2020-09-24
Payer: MEDICARE

## 2020-09-24 VITALS
BODY MASS INDEX: 31.07 KG/M2 | SYSTOLIC BLOOD PRESSURE: 151 MMHG | WEIGHT: 205 LBS | DIASTOLIC BLOOD PRESSURE: 91 MMHG | HEART RATE: 74 BPM | HEIGHT: 68 IN

## 2020-09-24 PROCEDURE — 73564 X-RAY EXAM KNEE 4 OR MORE: CPT | Mod: LT

## 2020-09-24 PROCEDURE — 72190 X-RAY EXAM OF PELVIS: CPT

## 2020-09-24 PROCEDURE — 99214 OFFICE O/P EST MOD 30 MIN: CPT

## 2020-09-24 NOTE — PHYSICAL EXAM
[de-identified] : On general examination the patient is adequately groomed and nourished. The vital parameters are as recorded. \par There is no lymphedema or diffuse swelling, no varicose veins, no skin warmth/erythema/scars/swelling, no ulcers and no palpable lymph nodes or masses in both lower extremities. Bilateral pedal pulses are well palpable.\par Upper Extremity:\par Both right and left upper extremities are unremarkable in terms of skin rash, lesions, pigmentation, redness, tenderness, swelling, joint instability, abnormal deformity or crepitus. The overall range of motion, sensation, motor tone and strength testing are normal.\par  \par Knee Exam\par The gait is left stiff knee antalgic.\par Knee alignment:         mild varus. \par Both knees demonstrate no scars and the skin has no warmth, erythema, swelling or tenderness. \par Both knees have a range of motion of\par Extension:                    Right 0 degrees             Left 0 degrees\par Flexion:                                   Right 125 degrees          Left 120 degrees\par there is pain with ROM of the left knee. \par Left Knee: There is medial joint line tenderness. There is mild effusion. \par Dylan's test is positive. Padmini test is positive.\par Lachman's test, Anterior/Posterior Drawer test and Pivot Shift Tests are negative. \par There is left knee grade 1 MCL mediolateral laxity and no anteroposterior instability. \par Patella compression test is negative and patellofemoral tracking is normal with no lateral subluxation, apprehension or instability. \par Right knee quadriceps and hamstrings power is 5. Right Knee Exam is normal.\par Left knee quadriceps and hamstrings power is 4+.\par \par Hip Exam:\par The gait and station is normal\par The patient has equal leg lengths and no pelvic tilt. Jerry/Opal test is 7 inches on the right and 7 inches on the left. Active SLR is 60 degrees on the right and 60 degrees on the left. The right hip demonstrates no scars and the skin has no signs of inflammation or tenderness. The left thigh demonstrates well healed scar from prior ORIF femur. \par Both Hips have a normal range of motion of flexion to 100 degrees, abduction 40 degrees, adduction 20 degrees, external rotation 40 degrees, internal rotation 20 degrees with symmetrical motion in flexion and extension. There is no flexion contracture, deformity or instability. Labral impingement tests are negative.\par Both hips flexor, abductor and extensor power is normal.\par \par Spine Exam:\par There is mild curvature of the spine with loss of normal lumbar lordosis. The skin is devoid of erythema, scars, pigmentation or rashes. There is mild lumbar spasm and tenderness especially at L4-L5 or L5-S1. \par The range of motion of the lumbar spine is limited by pain and spasm. Forward flexion is 80% normal, extension catch positive, lateral flexion and rotation 80% normal. There is no lumbar spine imbalance or instability detected.\par Bilateral passive SLR is right 40 degrees, left 40 degrees in supine and sitting positions. Lasegue's Test is negative.\par Neurology:\par The patient is alert and oriented in person, place and time. The mood is calm and affect is normal.\par Testing for coordination including Rhomberg's Test and Finger-Nose Test, sensation, motor tone and power and deep tendon reflexes in both lower extremities is normal.\par  [de-identified] : The following radiographs were ordered and read by me during this patients visit. I reviewed each radiograph in detail with the patient and discussed the findings as highlighted below. \par AP, lateral, Dinh and skyline views of the left knee confirm advanced degenerative joint disease with medial joint space narrowing and osteophyte formation\par AP view of the pelvis reveal preservation  of the joint space, with bony changes of the femur secondary to previous fracture. \par \par

## 2020-09-24 NOTE — DISCUSSION/SUMMARY
[de-identified] : Left knee DJD\par The natural history and treatment of degenerative arthritis was discussed with the patient at length today. The spectrum of treatment including nonoperative modalities to surgical intervention was elucidated. Noninvasive and nonoperative treatment modalities include weight reduction, activity modification with low impact exercise,  as needed use of acetaminophen or anti-inflammatory medications if tolerated, glucosamine/chondroitin supplements, and physical therapy. Further treatments can include corticosteroid injection and the use of viscosupplementation with hyaluronic acid injections. Definitive surgical treatment can certainly include total joint arthroplasty also. The risks and benefits of each treatment options was discussed and all questions were answered.\par The patient was informed of the findings and recommended conservative management in the form of a home exercise program, activity modifications, stationary bicycling, swimming and weight loss program. A trial of Glucosamine and Chondroiten Sulphate was recommended.\par A prescription for a course of physical therapy was provided.\par A prescription for non-steroidal anti-inflammatory medication - diclofenac was provided and the risks, benefits and side effects were discussed.\par Follow-up appointment was recommended in 3-6 months. \par \par \par
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
## 2020-09-29 ENCOUNTER — APPOINTMENT (OUTPATIENT)
Dept: ORTHOPEDIC SURGERY | Facility: CLINIC | Age: 65
End: 2020-09-29
Payer: SUBSIDIZED

## 2020-09-29 VITALS
HEART RATE: 85 BPM | OXYGEN SATURATION: 95 % | WEIGHT: 216 LBS | BODY MASS INDEX: 32.74 KG/M2 | HEIGHT: 68 IN | DIASTOLIC BLOOD PRESSURE: 87 MMHG | SYSTOLIC BLOOD PRESSURE: 135 MMHG

## 2020-09-29 PROCEDURE — 99213 OFFICE O/P EST LOW 20 MIN: CPT | Mod: NC
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
## 2020-09-29 NOTE — DISCUSSION/SUMMARY
[de-identified] : Left knee DJD\par The natural history and treatment of degenerative arthritis was discussed with the patient at length today. The spectrum of treatment including nonoperative modalities to surgical intervention was elucidated. Noninvasive and nonoperative treatment modalities include weight reduction, activity modification with low impact exercise,  as needed use of acetaminophen or anti-inflammatory medications if tolerated, glucosamine/chondroitin supplements, and physical therapy. Further treatments can include corticosteroid injection and the use of viscosupplementation with hyaluronic acid injections. Definitive surgical treatment can certainly include total joint arthroplasty also. The risks and benefits of each treatment options was discussed and all questions were answered.\par The patient was consented today for the [facility] HA/PRP clincal trial, and will have lab work as necessary as per trial protocols. \par He will follow up for his next trial visit next week.

## 2020-09-29 NOTE — PHYSICAL EXAM
[de-identified] : On general examination the patient is adequately groomed and nourished. The vital parameters are as recorded. \par There is no lymphedema or diffuse swelling, no varicose veins, no skin warmth/erythema/scars/swelling, no ulcers and no palpable lymph nodes or masses in both lower extremities. Bilateral pedal pulses are well palpable.\par Upper Extremity:\par Both right and left upper extremities are unremarkable in terms of skin rash, lesions, pigmentation, redness, tenderness, swelling, joint instability, abnormal deformity or crepitus. The overall range of motion, sensation, motor tone and strength testing are normal.\par  \par Knee Exam\par The gait is left stiff knee antalgic.\par Knee alignment:        Varus 6 degrees left knee. \par Both knees demonstrate no scars and the skin has no warmth, erythema, swelling or tenderness. \par Both knees have a range of motion of\par Extension:                    Right 0 degrees             Left 0 degrees\par Flexion:                                   Right 125 degrees          Left 120 degrees\par there is pain with ROM of the left knee. \par Left Knee: There is medial joint line tenderness. There is no effusion. \par Dylan's test is positive. Padmini test is positive.\par Lachman's test, Anterior/Posterior Drawer test and Pivot Shift Tests are negative. \par There is left knee grade 1 MCL mediolateral laxity and no anteroposterior instability. \par Patella compression test is negative and patellofemoral tracking is normal with no lateral subluxation, apprehension or instability. \par Right knee quadriceps and hamstrings power is 5. Right Knee Exam is normal.\par Left knee quadriceps and hamstrings power is 4+.\par \par Hip Exam:\par The gait and station is normal\par The patient has equal leg lengths and no pelvic tilt. Jerry/Opal test is 7 inches on the right and 7 inches on the left. Active SLR is 60 degrees on the right and 60 degrees on the left. The right hip demonstrates no scars and the skin has no signs of inflammation or tenderness. The left thigh demonstrates well healed scar from prior ORIF femur. \par Both Hips have a normal range of motion of flexion to 100 degrees, abduction 40 degrees, adduction 20 degrees, external rotation 40 degrees, internal rotation 20 degrees with symmetrical motion in flexion and extension. There is no flexion contracture, deformity or instability. Labral impingement tests are negative.\par Both hips flexor, abductor and extensor power is normal.\par \par Spine Exam:\par There is mild curvature of the spine with loss of normal lumbar lordosis. The skin is devoid of erythema, scars, pigmentation or rashes. There is mild lumbar spasm and tenderness especially at L4-L5 or L5-S1. \par The range of motion of the lumbar spine is limited by pain and spasm. Forward flexion is 80% normal, extension catch positive, lateral flexion and rotation 80% normal. There is no lumbar spine imbalance or instability detected.\par Bilateral passive SLR is right 40 degrees, left 40 degrees in supine and sitting positions. Lasegue's Test is negative.\par Neurology:\par The patient is alert and oriented in person, place and time. The mood is calm and affect is normal.\par Testing for coordination including Rhomberg's Test and Finger-Nose Test, sensation, motor tone and power and deep tendon reflexes in both lower extremities is normal.\par  [de-identified] : The following radiographs were reviewed and read by me during this patients visit. I reviewed each radiograph in detail with the patient and discussed the findings as highlighted below. \par AP, lateral, Dinh and skyline views of the left knee confirm advanced degenerative joint disease with medial joint space narrowing and osteophyte formation\par AP view of the pelvis reveal preservation of the joint space, with bony changes of the femur secondary to previous fracture. \par \par

## 2020-10-08 ENCOUNTER — APPOINTMENT (OUTPATIENT)
Dept: ORTHOPEDIC SURGERY | Facility: CLINIC | Age: 65
End: 2020-10-08
Payer: MEDICARE

## 2020-10-08 VITALS
DIASTOLIC BLOOD PRESSURE: 77 MMHG | TEMPERATURE: 97.6 F | HEART RATE: 96 BPM | SYSTOLIC BLOOD PRESSURE: 125 MMHG | BODY MASS INDEX: 32.74 KG/M2 | HEIGHT: 68 IN | WEIGHT: 216 LBS

## 2020-10-08 PROCEDURE — 20610 DRAIN/INJ JOINT/BURSA W/O US: CPT | Mod: NC

## 2020-10-08 PROCEDURE — 99213 OFFICE O/P EST LOW 20 MIN: CPT | Mod: NC,25

## 2020-10-08 PROCEDURE — ZZZZZ: CPT

## 2020-10-08 NOTE — PHYSICAL EXAM
[de-identified] : On general examination the patient is adequately groomed and nourished. The vital parameters are as recorded. \par There is no lymphedema or diffuse swelling, no varicose veins, no skin warmth/erythema/scars/swelling, no ulcers and no palpable lymph nodes or masses in both lower extremities. Bilateral pedal pulses are well palpable.\par Upper Extremity:\par Both right and left upper extremities are unremarkable in terms of skin rash, lesions, pigmentation, redness, tenderness, swelling, joint instability, abnormal deformity or crepitus. The overall range of motion, sensation, motor tone and strength testing are normal.\par  \par Knee Exam\par The gait is left stiff knee antalgic.\par Knee alignment:        Varus 6 degrees left knee. \par Both knees demonstrate no scars and the skin has no warmth, erythema, swelling or tenderness. \par Both knees have a range of motion of\par Extension:                    Right 0 degrees             Left 0 degrees\par Flexion:                                   Right 125 degrees          Left 120 degrees\par there is pain with ROM of the left knee. \par Left Knee: There is medial joint line tenderness. There is no effusion. \par Dylan's test is positive. Padmini test is positive.\par Lachman's test, Anterior/Posterior Drawer test and Pivot Shift Tests are negative. \par There is left knee grade 1 MCL mediolateral laxity and no anteroposterior instability. \par Patella compression test is negative and patellofemoral tracking is normal with no lateral subluxation, apprehension or instability. \par Right knee quadriceps and hamstrings power is 5. Right Knee Exam is normal.\par Left knee quadriceps and hamstrings power is 4+.\par \par Hip Exam:\par The gait and station is normal\par The patient has equal leg lengths and no pelvic tilt. Jerry/Opal test is 7 inches on the right and 7 inches on the left. Active SLR is 60 degrees on the right and 60 degrees on the left. The right hip demonstrates no scars and the skin has no signs of inflammation or tenderness. The left thigh demonstrates well healed scar from prior ORIF femur. \par Both Hips have a normal range of motion of flexion to 100 degrees, abduction 40 degrees, adduction 20 degrees, external rotation 40 degrees, internal rotation 20 degrees with symmetrical motion in flexion and extension. There is no flexion contracture, deformity or instability. Labral impingement tests are negative.\par Both hips flexor, abductor and extensor power is normal.\par \par Spine Exam:\par There is mild curvature of the spine with loss of normal lumbar lordosis. The skin is devoid of erythema, scars, pigmentation or rashes. There is mild lumbar spasm and tenderness especially at L4-L5 or L5-S1. \par The range of motion of the lumbar spine is limited by pain and spasm. Forward flexion is 80% normal, extension catch positive, lateral flexion and rotation 80% normal. There is no lumbar spine imbalance or instability detected.\par Bilateral passive SLR is right 40 degrees, left 40 degrees in supine and sitting positions. Lasegue's Test is negative.\par Neurology:\par The patient is alert and oriented in person, place and time. The mood is calm and affect is normal.\par Testing for coordination including Rhomberg's Test and Finger-Nose Test, sensation, motor tone and power and deep tendon reflexes in both lower extremities is normal.\par

## 2020-10-08 NOTE — PROCEDURE
[de-identified] : Discussed at length with the patient the planned injection.  The risks, benefits, convalescence and alternatives were reviewed. The possible side effects discussed included but were not limited to: pain, swelling, heat and redness. There symptoms are generally mild but if they are extensive then contact the office. Giving pain relievers by mouth such as NSAID’s or Tylenol can generally treat the reactions to intraarticular injections. Rare cases of infection have been noted. \par \par Following this discussion, the left knee was prepped with alcohol and Betadine swab stick, and under sterile condition the  injection (HA/PRP/Placebo) was performed with a 21 gauge needle. The needle was introduced into the joint, aspiration was performed to ensure intra-articular placement and the medication was injected. Upon withdrawal of the needle the site was cleaned with alcohol and a bandaid applied. The patient tolerated the injection well and there were no adverse effects. Post injection instructions included no strenuous activity for 24 hours, cryotherapy and if there are any adverse effects to contact the office.\par

## 2020-10-08 NOTE — DISCUSSION/SUMMARY
[de-identified] : Left knee DJD\par The natural history and treatment of degenerative arthritis was discussed with the patient at length today. The spectrum of treatment including nonoperative modalities to surgical intervention was elucidated. Noninvasive and nonoperative treatment modalities include weight reduction, activity modification with low impact exercise,  as needed use of acetaminophen or anti-inflammatory medications if tolerated, glucosamine/chondroitin supplements, and physical therapy. Further treatments can include corticosteroid injection and the use of viscosupplementation with hyaluronic acid injections. Definitive surgical treatment can certainly include total joint arthroplasty also. The risks and benefits of each treatment options was discussed and all questions were answered.\par The patient received the first injection (HA/PCP/Placebo) for the [facility] HA/PRP clincal trial, administed by Sindy Polk PA-C. \par He will follow up for his next trial visit in 30 days as per protocol.

## 2020-10-08 NOTE — REASON FOR VISIT
[Follow-Up Visit] : a follow-up visit for [Knee Pain] : knee pain [FreeTextEntry2] : here for clinical trial visit #1

## 2020-10-08 NOTE — HISTORY OF PRESENT ILLNESS
[de-identified] : Mr. KENNETH VALLEJO is a 65 year old male  presenting with long standing left knee pain here for his first visit for the  [facility] HA/PRP for osteoarthritis clinical trial. \par He presents today with worsening left knee pain and continued frequent buckling. He notes his walking is limited due to pain. He localizes the pain to the medial aspect of the knee. He denies right knee pain.  [8] : a current pain level of 8/10

## 2020-11-06 ENCOUNTER — APPOINTMENT (OUTPATIENT)
Dept: DERMATOLOGY | Facility: CLINIC | Age: 65
End: 2020-11-06
Payer: MEDICARE

## 2020-11-06 DIAGNOSIS — L28.0 LICHEN SIMPLEX CHRONICUS: ICD-10-CM

## 2020-11-06 PROCEDURE — 99213 OFFICE O/P EST LOW 20 MIN: CPT | Mod: GC

## 2020-11-06 RX ORDER — AMMONIUM LACTATE 12 %
12 CREAM (GRAM) TOPICAL TWICE DAILY
Qty: 1 | Refills: 2 | Status: ACTIVE | COMMUNITY
Start: 2020-11-06 | End: 1900-01-01

## 2020-11-12 ENCOUNTER — APPOINTMENT (OUTPATIENT)
Dept: ORTHOPEDIC SURGERY | Facility: CLINIC | Age: 65
End: 2020-11-12
Payer: MEDICARE

## 2020-11-12 ENCOUNTER — OUTPATIENT (OUTPATIENT)
Dept: OUTPATIENT SERVICES | Facility: HOSPITAL | Age: 65
LOS: 1 days | End: 2020-11-12
Payer: MEDICARE

## 2020-11-12 ENCOUNTER — RESULT REVIEW (OUTPATIENT)
Age: 65
End: 2020-11-12

## 2020-11-12 ENCOUNTER — APPOINTMENT (OUTPATIENT)
Dept: MRI IMAGING | Facility: CLINIC | Age: 65
End: 2020-11-12
Payer: MEDICARE

## 2020-11-12 VITALS — DIASTOLIC BLOOD PRESSURE: 70 MMHG | SYSTOLIC BLOOD PRESSURE: 130 MMHG | HEART RATE: 90 BPM

## 2020-11-12 VITALS — TEMPERATURE: 97.6 F

## 2020-11-12 DIAGNOSIS — Z00.8 ENCOUNTER FOR OTHER GENERAL EXAMINATION: ICD-10-CM

## 2020-11-12 PROCEDURE — 73721 MRI JNT OF LWR EXTRE W/O DYE: CPT | Mod: 26,LT

## 2020-11-12 PROCEDURE — 73721 MRI JNT OF LWR EXTRE W/O DYE: CPT

## 2020-11-12 PROCEDURE — 99212 OFFICE O/P EST SF 10 MIN: CPT

## 2020-11-12 NOTE — PROCEDURE
[de-identified] : Discussed at length with the patient the planned injection.  The risks, benefits, convalescence and alternatives were reviewed. The possible side effects discussed included but were not limited to: pain, swelling, heat and redness. There symptoms are generally mild but if they are extensive then contact the office. Giving pain relievers by mouth such as NSAID’s or Tylenol can generally treat the reactions to intraarticular injections. Rare cases of infection have been noted. \par \par Following this discussion, the left knee was prepped with alcohol and Betadine swab stick, and under sterile condition the  injection (HA/PRP/Placebo) was performed with a 21 gauge needle. The needle was introduced into the joint, aspiration was performed to ensure intra-articular placement and the medication was injected. Upon withdrawal of the needle the site was cleaned with alcohol and a bandaid applied. The patient tolerated the injection well and there were no adverse effects. Post injection instructions included no strenuous activity for 24 hours, cryotherapy and if there are any adverse effects to contact the office.\par

## 2020-11-12 NOTE — DISCUSSION/SUMMARY
[de-identified] : Left knee DJD\par The natural history and treatment of degenerative arthritis was discussed with the patient at length today. The spectrum of treatment including nonoperative modalities to surgical intervention was elucidated. Noninvasive and nonoperative treatment modalities include weight reduction, activity modification with low impact exercise,  as needed use of acetaminophen or anti-inflammatory medications if tolerated, glucosamine/chondroitin supplements, and physical therapy. Further treatments can include corticosteroid injection and the use of viscosupplementation with hyaluronic acid injections. Definitive surgical treatment can certainly include total joint arthroplasty also. The risks and benefits of each treatment options was discussed and all questions were answered.\par We discussed that he may need a total knee replacement, but at this time I would like to obtain an MRI of the left knee to assess articular cartilage wear and level of arthritic change, as the lateral and patellofemoral joint compartments appear to remain stable on imaging. Based on MRI findings, we will discuss the type of surgery that is needed, total knee replacement vs partial knee replacement. \par He will follow up after the MRI to review and discuss surgical intervention at that time.

## 2020-11-12 NOTE — REASON FOR VISIT
[Follow-Up Visit] : a follow-up visit for [Knee Pain] : knee pain [FreeTextEntry2] : here for clinical trial visit #2 worsening left knee pain.

## 2020-11-12 NOTE — CONSULT LETTER
[Dear  ___] : Dear  [unfilled], [Consult Letter:] : I had the pleasure of evaluating your patient, [unfilled]. [Please see my note below.] : Please see my note below. [Consult Closing:] : Thank you very much for allowing me to participate in the care of this patient.  If you have any questions, please do not hesitate to contact me. [Sincerely,] : Sincerely, [FreeTextEntry2] : SHARON TRAN\par  [FreeTextEntry3] : Carlos Martinez MD\par \par ______________________________________________\par [facility]: Hip/Knee Arthroplasty\par [address], [address], [place] [address]\par (t) [phone]\par (f) [phone]\par

## 2020-11-12 NOTE — PHYSICAL EXAM
[de-identified] : On general examination the patient is adequately groomed and nourished. The vital parameters are as recorded. \par There is no lymphedema or diffuse swelling, no varicose veins, no skin warmth/erythema/scars/swelling, no ulcers and no palpable lymph nodes or masses in both lower extremities. Bilateral pedal pulses are well palpable.\par Upper Extremity:\par Both right and left upper extremities are unremarkable in terms of skin rash, lesions, pigmentation, redness, tenderness, swelling, joint instability, abnormal deformity or crepitus. The overall range of motion, sensation, motor tone and strength testing are normal.\par  \par Knee Exam\par The gait is left stiff knee antalgic.\par Knee alignment:        Varus 6 degrees left knee. \par Both knees demonstrate no scars and the skin has no warmth, erythema, swelling or tenderness. \par Both knees have a range of motion of\par Extension:                    Right 0 degrees             Left 0 degrees\par Flexion:                                   Right 125 degrees          Left 120 degrees\par there is pain with ROM of the left knee. \par Left Knee: There is medial joint line tenderness. There is no effusion. \par Dylan's test is positive. Padmini test is positive.\par Lachman's test, Anterior/Posterior Drawer test and Pivot Shift Tests are negative. \par There is left knee grade 1 MCL mediolateral laxity and no anteroposterior instability. \par Patella compression test is negative and patellofemoral tracking is normal with no lateral subluxation, apprehension or instability. \par Right knee quadriceps and hamstrings power is 5. Right Knee Exam is normal.\par Left knee quadriceps and hamstrings power is 4+.\par \par Hip Exam:\par The gait and station is normal\par The patient has equal leg lengths and no pelvic tilt. Jerry/Opal test is 7 inches on the right and 7 inches on the left. Active SLR is 60 degrees on the right and 60 degrees on the left. The right hip demonstrates no scars and the skin has no signs of inflammation or tenderness. The left thigh demonstrates well healed scar from prior ORIF femur. \par Both Hips have a normal range of motion of flexion to 100 degrees, abduction 40 degrees, adduction 20 degrees, external rotation 40 degrees, internal rotation 20 degrees with symmetrical motion in flexion and extension. There is no flexion contracture, deformity or instability. Labral impingement tests are negative.\par Both hips flexor, abductor and extensor power is normal.\par \par Spine Exam:\par There is mild curvature of the spine with loss of normal lumbar lordosis. The skin is devoid of erythema, scars, pigmentation or rashes. There is mild lumbar spasm and tenderness especially at L4-L5 or L5-S1. \par The range of motion of the lumbar spine is limited by pain and spasm. Forward flexion is 80% normal, extension catch positive, lateral flexion and rotation 80% normal. There is no lumbar spine imbalance or instability detected.\par Bilateral passive SLR is right 40 degrees, left 40 degrees in supine and sitting positions. Lasegue's Test is negative.\par Neurology:\par The patient is alert and oriented in person, place and time. The mood is calm and affect is normal.\par Testing for coordination including Rhomberg's Test and Finger-Nose Test, sensation, motor tone and power and deep tendon reflexes in both lower extremities is normal.\par  [de-identified] : The following radiographs were taken last visit and read by me during this patients visit. I reviewed each radiograph in detail with the patient and discussed the findings as highlighted below. \par AP, lateral and skyline views along with Dinh view of the left knee confirm degenerative joint disease with medial joint space narrowing posterior medial aspect, and osteophyte formation. \par AP view of the pelvis are within normal limits with preservation of the joint spaces bilaterally. There is evidence of previous left femur fracture, healed, shadows consistent with prior hardware removal

## 2020-11-12 NOTE — HISTORY OF PRESENT ILLNESS
[8] : a current pain level of 8/10 [Worsening] : worsening [Constant] : ~He/She~ states the symptoms seem to be constant [Walking] : worsened by walking [Rest] : relieved by rest [de-identified] : Mr. KENNETH VALLEJO is a 65 year old male  presenting with long standing left knee pain here for visit #2 for the  [facility] HA/PRP for osteoarthritis clinical trial to receive the second injection. He reports no significant relief from the first injection. He notes he has had no relief from the first injection. He notes his symptoms are progressing, with no relief from PT, along with use of NSAIDs and Tylenol in the past. He notes limitations in ADLs, quality of life. \par He presents today with worsening left knee pain and continued frequent buckling. He notes his walking is limited due to pain. He localizes the pain to the medial aspect of the knee. He denies right knee pain. He notes he would like to continue with discussion about total knee replacement, and discontinue the trial. \par Prior femur fracture in 1981.

## 2020-11-15 ENCOUNTER — APPOINTMENT (OUTPATIENT)
Dept: CT IMAGING | Facility: IMAGING CENTER | Age: 65
End: 2020-11-15

## 2020-11-16 ENCOUNTER — RESULT REVIEW (OUTPATIENT)
Age: 65
End: 2020-11-16

## 2020-11-16 ENCOUNTER — OUTPATIENT (OUTPATIENT)
Dept: OUTPATIENT SERVICES | Facility: HOSPITAL | Age: 65
LOS: 1 days | End: 2020-11-16
Payer: MEDICARE

## 2020-11-16 ENCOUNTER — APPOINTMENT (OUTPATIENT)
Dept: CT IMAGING | Facility: IMAGING CENTER | Age: 65
End: 2020-11-16
Payer: MEDICARE

## 2020-11-16 DIAGNOSIS — M17.12 UNILATERAL PRIMARY OSTEOARTHRITIS, LEFT KNEE: ICD-10-CM

## 2020-11-16 PROCEDURE — 73700 CT LOWER EXTREMITY W/O DYE: CPT

## 2020-11-16 PROCEDURE — 73700 CT LOWER EXTREMITY W/O DYE: CPT | Mod: 26,LT

## 2020-11-19 ENCOUNTER — APPOINTMENT (OUTPATIENT)
Dept: ORTHOPEDIC SURGERY | Facility: CLINIC | Age: 65
End: 2020-11-19
Payer: MEDICARE

## 2020-11-19 VITALS
DIASTOLIC BLOOD PRESSURE: 70 MMHG | HEIGHT: 68 IN | SYSTOLIC BLOOD PRESSURE: 130 MMHG | BODY MASS INDEX: 32.58 KG/M2 | HEART RATE: 90 BPM | WEIGHT: 215 LBS

## 2020-11-19 DIAGNOSIS — M17.12 UNILATERAL PRIMARY OSTEOARTHRITIS, LEFT KNEE: ICD-10-CM

## 2020-11-19 PROCEDURE — 99215 OFFICE O/P EST HI 40 MIN: CPT

## 2020-12-01 ENCOUNTER — OUTPATIENT (OUTPATIENT)
Dept: OUTPATIENT SERVICES | Facility: HOSPITAL | Age: 65
LOS: 1 days | End: 2020-12-01
Payer: MEDICARE

## 2020-12-01 VITALS
DIASTOLIC BLOOD PRESSURE: 75 MMHG | HEIGHT: 68 IN | SYSTOLIC BLOOD PRESSURE: 115 MMHG | TEMPERATURE: 98 F | OXYGEN SATURATION: 96 % | RESPIRATION RATE: 17 BRPM | HEART RATE: 89 BPM | WEIGHT: 216.05 LBS

## 2020-12-01 DIAGNOSIS — Z98.890 OTHER SPECIFIED POSTPROCEDURAL STATES: Chronic | ICD-10-CM

## 2020-12-01 DIAGNOSIS — Z01.818 ENCOUNTER FOR OTHER PREPROCEDURAL EXAMINATION: ICD-10-CM

## 2020-12-01 DIAGNOSIS — Z87.81 PERSONAL HISTORY OF (HEALED) TRAUMATIC FRACTURE: Chronic | ICD-10-CM

## 2020-12-01 DIAGNOSIS — Z90.89 ACQUIRED ABSENCE OF OTHER ORGANS: Chronic | ICD-10-CM

## 2020-12-01 DIAGNOSIS — M17.12 UNILATERAL PRIMARY OSTEOARTHRITIS, LEFT KNEE: ICD-10-CM

## 2020-12-01 DIAGNOSIS — M62.08 SEPARATION OF MUSCLE (NONTRAUMATIC), OTHER SITE: Chronic | ICD-10-CM

## 2020-12-01 PROCEDURE — G0463: CPT

## 2020-12-01 PROCEDURE — 87640 STAPH A DNA AMP PROBE: CPT

## 2020-12-01 PROCEDURE — 87641 MR-STAPH DNA AMP PROBE: CPT

## 2020-12-01 PROCEDURE — 86900 BLOOD TYPING SEROLOGIC ABO: CPT

## 2020-12-01 PROCEDURE — 86901 BLOOD TYPING SEROLOGIC RH(D): CPT

## 2020-12-01 PROCEDURE — 80048 BASIC METABOLIC PNL TOTAL CA: CPT

## 2020-12-01 PROCEDURE — 86850 RBC ANTIBODY SCREEN: CPT

## 2020-12-01 PROCEDURE — 83036 HEMOGLOBIN GLYCOSYLATED A1C: CPT

## 2020-12-01 PROCEDURE — 85027 COMPLETE CBC AUTOMATED: CPT

## 2020-12-01 RX ORDER — TRAMADOL HYDROCHLORIDE 50 MG/1
50 TABLET ORAL ONCE
Refills: 0 | Status: DISCONTINUED | OUTPATIENT
Start: 2020-12-07 | End: 2020-12-07

## 2020-12-01 RX ORDER — CEFAZOLIN SODIUM 1 G
2000 VIAL (EA) INJECTION ONCE
Refills: 0 | Status: DISCONTINUED | OUTPATIENT
Start: 2020-12-07 | End: 2020-12-07

## 2020-12-01 RX ORDER — SODIUM CHLORIDE 9 MG/ML
3 INJECTION INTRAMUSCULAR; INTRAVENOUS; SUBCUTANEOUS EVERY 8 HOURS
Refills: 0 | Status: DISCONTINUED | OUTPATIENT
Start: 2020-12-07 | End: 2020-12-07

## 2020-12-01 RX ORDER — CHLORHEXIDINE GLUCONATE 213 G/1000ML
1 SOLUTION TOPICAL ONCE
Refills: 0 | Status: DISCONTINUED | OUTPATIENT
Start: 2020-12-07 | End: 2020-12-07

## 2020-12-01 RX ORDER — PANTOPRAZOLE SODIUM 20 MG/1
40 TABLET, DELAYED RELEASE ORAL ONCE
Refills: 0 | Status: COMPLETED | OUTPATIENT
Start: 2020-12-07 | End: 2020-12-07

## 2020-12-01 RX ORDER — APREPITANT 80 MG/1
40 CAPSULE ORAL ONCE
Refills: 0 | Status: COMPLETED | OUTPATIENT
Start: 2020-12-07 | End: 2020-12-07

## 2020-12-01 RX ORDER — LIDOCAINE HCL 20 MG/ML
0.2 VIAL (ML) INJECTION ONCE
Refills: 0 | Status: DISCONTINUED | OUTPATIENT
Start: 2020-12-07 | End: 2020-12-07

## 2020-12-01 NOTE — H&P PST ADULT - NSICDXPASTSURGICALHX_GEN_ALL_CORE_FT
PAST SURGICAL HISTORY:  S/P rotator cuff repair      PAST SURGICAL HISTORY:  H/O clavicle fracture right 1981    H/O elbow surgery left 2007    H/O fracture of femur left   surgery  1981 1982    H/O fracture of pelvis surgery 1981 x 2    History of eyelid surgery 2013   upper and lower    Rectus diastasis 2014    S/P arthroscopic surgery of left knee 2001    S/P cystoscopy ureteral strictures  2008    S/P left inguinal hernia repair 2003    S/P nasal septoplasty 1986    S/P plastic surgery skin graft  scar revison  1981 1982    S/P rotator cuff repair right 2008,2008,2012,2013    S/P rotator cuff surgery left  2000    S/P tonsillectomy 1971

## 2020-12-01 NOTE — H&P PST ADULT - NSICDXPROBLEM_GEN_ALL_CORE_FT
PROBLEM DIAGNOSES  Problem: Unilateral primary osteoarthritis, left knee  Assessment and Plan: left total knee replacement AVELINO robot

## 2020-12-01 NOTE — H&P PST ADULT - ASSESSMENT
CAPRINI SCORE [CLOT]    AGE RELATED RISK FACTORS                                                       MOBILITY RELATED FACTORS  [ ] Age 41-60 years                                            (1 Point)                  [ ] Bed rest                                                        (1 Point)  [x ] Age: 61-74 years                                           (2 Points)                 [ ] Plaster cast                                                   (2 Points)  [ ] Age= 75 years                                              (3 Points)                 [ ] Bed bound for more than 72 hours                 (2 Points)    DISEASE RELATED RISK FACTORS                                               GENDER SPECIFIC FACTORS  [ ] Edema in the lower extremities                       (1 Point)                  [ ] Pregnancy                                                     (1 Point)  [ ] Varicose veins                                               (1 Point)                  [ ] Post-partum < 6 weeks                                   (1 Point)             [x ] BMI > 25 Kg/m2                                            (1 Point)                  [ ] Hormonal therapy  or oral contraception          (1 Point)                 [ ] Sepsis (in the previous month)                        (1 Point)                  [ ] History of pregnancy complications                 (1 point)  [ ] Pneumonia or serious lung disease                                               [ ] Unexplained or recurrent                     (1 Point)           (in the previous month)                               (1 Point)  [ ] Abnormal pulmonary function test                     (1 Point)                 SURGERY RELATED RISK FACTORS  [ ] Acute myocardial infarction                              (1 Point)                 [ ]  Section                                             (1 Point)  [ ] Congestive heart failure (in the previous month)  (1 Point)               [ ] Minor surgery                                                  (1 Point)   [ ] Inflammatory bowel disease                             (1 Point)                 [ ] Arthroscopic surgery                                        (2 Points)  [ ] Central venous access                                      (2 Points)                [ ] General surgery lasting more than 45 minutes   (2 Points)       [ ] Stroke (in the previous month)                          (5 Points)               [x ] Elective arthroplasty                                         (5 Points)                                                                                                                                               HEMATOLOGY RELATED FACTORS                                                 TRAUMA RELATED RISK FACTORS  [ ] Prior episodes of VTE                                     (3 Points)                [ ] Fracture of the hip, pelvis, or leg                       (5 Points)  [ ] Positive family history for VTE                         (3 Points)                 [ ] Acute spinal cord injury (in the previous month)  (5 Points)  [ ] Prothrombin 42828 A                                     (3 Points)                 [ ] Paralysis  (less than 1 month)                             (5 Points)  [ ] Factor V Leiden                                             (3 Points)                  [ ] Multiple Trauma within 1 month                        (5 Points)  [ ] Lupus anticoagulants                                     (3 Points)                                                           [ ] Anticardiolipin antibodies                               (3 Points)                                                       [ ] High homocysteine in the blood                      (3 Points)                                             [ ] Other congenital or acquired thrombophilia      (3 Points)                                                [ ] Heparin induced thrombocytopenia                  (3 Points)                                          Total Score [  8        ]    Caprini Score 0 - 2:  Low Risk, No VTE Prophylaxis required for most patients, encourage ambulation  Caprini Score 3 - 6:  At Risk, pharmacologic VTE prophylaxis is indicated for most patients (in the absence of a contraindication)  Caprini Score Greater than or = 7:  High Risk, pharmacologic VTE prophylaxis is indicated for most patients (in the absence of a contraindication)
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
## 2020-12-03 RX ORDER — MUPIROCIN 20 MG/G
2 OINTMENT TOPICAL
Qty: 1 | Refills: 0 | Status: ACTIVE | COMMUNITY
Start: 2020-12-03 | End: 1900-01-01

## 2020-12-04 ENCOUNTER — OUTPATIENT (OUTPATIENT)
Dept: OUTPATIENT SERVICES | Facility: HOSPITAL | Age: 65
LOS: 1 days | End: 2020-12-04

## 2020-12-04 DIAGNOSIS — Z87.81 PERSONAL HISTORY OF (HEALED) TRAUMATIC FRACTURE: Chronic | ICD-10-CM

## 2020-12-04 DIAGNOSIS — Z98.890 OTHER SPECIFIED POSTPROCEDURAL STATES: Chronic | ICD-10-CM

## 2020-12-04 DIAGNOSIS — Z90.89 ACQUIRED ABSENCE OF OTHER ORGANS: Chronic | ICD-10-CM

## 2020-12-04 DIAGNOSIS — M62.08 SEPARATION OF MUSCLE (NONTRAUMATIC), OTHER SITE: Chronic | ICD-10-CM

## 2020-12-04 DIAGNOSIS — Z11.59 ENCOUNTER FOR SCREENING FOR OTHER VIRAL DISEASES: ICD-10-CM

## 2020-12-04 LAB — SARS-COV-2 RNA SPEC QL NAA+PROBE: SIGNIFICANT CHANGE UP

## 2020-12-06 ENCOUNTER — TRANSCRIPTION ENCOUNTER (OUTPATIENT)
Age: 65
End: 2020-12-06

## 2020-12-07 ENCOUNTER — APPOINTMENT (OUTPATIENT)
Dept: ORTHOPEDIC SURGERY | Facility: HOSPITAL | Age: 65
End: 2020-12-07

## 2020-12-07 ENCOUNTER — OUTPATIENT (OUTPATIENT)
Dept: INPATIENT UNIT | Facility: HOSPITAL | Age: 65
LOS: 1 days | End: 2020-12-07
Payer: MEDICARE

## 2020-12-07 VITALS
RESPIRATION RATE: 18 BRPM | DIASTOLIC BLOOD PRESSURE: 93 MMHG | WEIGHT: 216.05 LBS | SYSTOLIC BLOOD PRESSURE: 170 MMHG | OXYGEN SATURATION: 98 % | HEART RATE: 59 BPM | HEIGHT: 66 IN | TEMPERATURE: 98 F

## 2020-12-07 DIAGNOSIS — Z87.81 PERSONAL HISTORY OF (HEALED) TRAUMATIC FRACTURE: Chronic | ICD-10-CM

## 2020-12-07 DIAGNOSIS — Z98.890 OTHER SPECIFIED POSTPROCEDURAL STATES: Chronic | ICD-10-CM

## 2020-12-07 DIAGNOSIS — Z90.89 ACQUIRED ABSENCE OF OTHER ORGANS: Chronic | ICD-10-CM

## 2020-12-07 DIAGNOSIS — Z96.641 PRESENCE OF RIGHT ARTIFICIAL HIP JOINT: ICD-10-CM

## 2020-12-07 DIAGNOSIS — M17.12 UNILATERAL PRIMARY OSTEOARTHRITIS, LEFT KNEE: ICD-10-CM

## 2020-12-07 DIAGNOSIS — M62.08 SEPARATION OF MUSCLE (NONTRAUMATIC), OTHER SITE: Chronic | ICD-10-CM

## 2020-12-07 LAB — GLUCOSE BLDC GLUCOMTR-MCNC: 106 MG/DL — HIGH (ref 70–99)

## 2020-12-07 RX ORDER — OXYCODONE HYDROCHLORIDE 5 MG/1
5 TABLET ORAL EVERY 4 HOURS
Refills: 0 | Status: DISCONTINUED | OUTPATIENT
Start: 2020-12-07 | End: 2020-12-08

## 2020-12-07 RX ORDER — LORATADINE 10 MG/1
10 TABLET ORAL DAILY
Refills: 0 | Status: DISCONTINUED | OUTPATIENT
Start: 2020-12-07 | End: 2020-12-08

## 2020-12-07 RX ORDER — ACETAMINOPHEN 500 MG
1000 TABLET ORAL ONCE
Refills: 0 | Status: COMPLETED | OUTPATIENT
Start: 2020-12-08 | End: 2020-12-08

## 2020-12-07 RX ORDER — ACETAMINOPHEN 500 MG
975 TABLET ORAL EVERY 8 HOURS
Refills: 0 | Status: DISCONTINUED | OUTPATIENT
Start: 2020-12-08 | End: 2020-12-08

## 2020-12-07 RX ORDER — ONDANSETRON 8 MG/1
4 TABLET, FILM COATED ORAL EVERY 6 HOURS
Refills: 0 | Status: DISCONTINUED | OUTPATIENT
Start: 2020-12-07 | End: 2020-12-08

## 2020-12-07 RX ORDER — ARIPIPRAZOLE 15 MG/1
5 TABLET ORAL AT BEDTIME
Refills: 0 | Status: DISCONTINUED | OUTPATIENT
Start: 2020-12-07 | End: 2020-12-08

## 2020-12-07 RX ORDER — ARIPIPRAZOLE 15 MG/1
1 TABLET ORAL
Qty: 0 | Refills: 0 | DISCHARGE

## 2020-12-07 RX ORDER — ALPRAZOLAM 0.25 MG
0.5 TABLET ORAL DAILY
Refills: 0 | Status: DISCONTINUED | OUTPATIENT
Start: 2020-12-07 | End: 2020-12-08

## 2020-12-07 RX ORDER — DEXAMETHASONE 0.5 MG/5ML
8 ELIXIR ORAL ONCE
Refills: 0 | Status: COMPLETED | OUTPATIENT
Start: 2020-12-08 | End: 2020-12-08

## 2020-12-07 RX ORDER — CHOLECALCIFEROL (VITAMIN D3) 125 MCG
1 CAPSULE ORAL
Qty: 0 | Refills: 0 | DISCHARGE

## 2020-12-07 RX ORDER — POLYETHYLENE GLYCOL 3350 17 G/17G
17 POWDER, FOR SOLUTION ORAL DAILY
Refills: 0 | Status: DISCONTINUED | OUTPATIENT
Start: 2020-12-07 | End: 2020-12-08

## 2020-12-07 RX ORDER — FLUTICASONE PROPIONATE 50 MCG
1 SPRAY, SUSPENSION NASAL
Qty: 0 | Refills: 0 | DISCHARGE

## 2020-12-07 RX ORDER — OXYCODONE HYDROCHLORIDE 5 MG/1
10 TABLET ORAL EVERY 4 HOURS
Refills: 0 | Status: DISCONTINUED | OUTPATIENT
Start: 2020-12-07 | End: 2020-12-08

## 2020-12-07 RX ORDER — ALPRAZOLAM 0.25 MG
1 TABLET ORAL
Qty: 0 | Refills: 0 | DISCHARGE

## 2020-12-07 RX ORDER — MAGNESIUM HYDROXIDE 400 MG/1
30 TABLET, CHEWABLE ORAL DAILY
Refills: 0 | Status: DISCONTINUED | OUTPATIENT
Start: 2020-12-07 | End: 2020-12-08

## 2020-12-07 RX ORDER — FLUVOXAMINE MALEATE 25 MG/1
25 TABLET ORAL AT BEDTIME
Refills: 0 | Status: DISCONTINUED | OUTPATIENT
Start: 2020-12-07 | End: 2020-12-08

## 2020-12-07 RX ORDER — LANOLIN ALCOHOL/MO/W.PET/CERES
3 CREAM (GRAM) TOPICAL AT BEDTIME
Refills: 0 | Status: DISCONTINUED | OUTPATIENT
Start: 2020-12-07 | End: 2020-12-08

## 2020-12-07 RX ORDER — UBIDECARENONE 100 MG
300 CAPSULE ORAL
Qty: 0 | Refills: 0 | DISCHARGE

## 2020-12-07 RX ORDER — CELECOXIB 200 MG/1
200 CAPSULE ORAL
Refills: 0 | Status: DISCONTINUED | OUTPATIENT
Start: 2020-12-09 | End: 2020-12-08

## 2020-12-07 RX ORDER — FEXOFENADINE HCL 30 MG
1 TABLET ORAL
Qty: 0 | Refills: 0 | DISCHARGE

## 2020-12-07 RX ORDER — TRAMADOL HYDROCHLORIDE 50 MG/1
50 TABLET ORAL EVERY 6 HOURS
Refills: 0 | Status: DISCONTINUED | OUTPATIENT
Start: 2020-12-07 | End: 2020-12-08

## 2020-12-07 RX ORDER — SIMVASTATIN 20 MG/1
1 TABLET, FILM COATED ORAL
Qty: 0 | Refills: 0 | DISCHARGE

## 2020-12-07 RX ORDER — SODIUM CHLORIDE 9 MG/ML
500 INJECTION INTRAMUSCULAR; INTRAVENOUS; SUBCUTANEOUS ONCE
Refills: 0 | Status: COMPLETED | OUTPATIENT
Start: 2020-12-07 | End: 2020-12-07

## 2020-12-07 RX ORDER — SODIUM CHLORIDE 9 MG/ML
500 INJECTION INTRAMUSCULAR; INTRAVENOUS; SUBCUTANEOUS ONCE
Refills: 0 | Status: DISCONTINUED | OUTPATIENT
Start: 2020-12-07 | End: 2020-12-08

## 2020-12-07 RX ORDER — PANTOPRAZOLE SODIUM 20 MG/1
40 TABLET, DELAYED RELEASE ORAL
Refills: 0 | Status: DISCONTINUED | OUTPATIENT
Start: 2020-12-07 | End: 2020-12-08

## 2020-12-07 RX ORDER — SENNA PLUS 8.6 MG/1
2 TABLET ORAL AT BEDTIME
Refills: 0 | Status: DISCONTINUED | OUTPATIENT
Start: 2020-12-07 | End: 2020-12-08

## 2020-12-07 RX ORDER — ACETAMINOPHEN 500 MG
1000 TABLET ORAL ONCE
Refills: 0 | Status: COMPLETED | OUTPATIENT
Start: 2020-12-07 | End: 2020-12-07

## 2020-12-07 RX ORDER — FLUVOXAMINE MALEATE 25 MG/1
1 TABLET ORAL
Qty: 0 | Refills: 0 | DISCHARGE

## 2020-12-07 RX ORDER — KETOROLAC TROMETHAMINE 30 MG/ML
15 SYRINGE (ML) INJECTION EVERY 6 HOURS
Refills: 0 | Status: DISCONTINUED | OUTPATIENT
Start: 2020-12-07 | End: 2020-12-08

## 2020-12-07 RX ORDER — FLUTICASONE PROPIONATE 50 MCG
1 SPRAY, SUSPENSION NASAL
Refills: 0 | Status: DISCONTINUED | OUTPATIENT
Start: 2020-12-07 | End: 2020-12-08

## 2020-12-07 RX ORDER — CHOLECALCIFEROL (VITAMIN D3) 125 MCG
2000 CAPSULE ORAL DAILY
Refills: 0 | Status: DISCONTINUED | OUTPATIENT
Start: 2020-12-07 | End: 2020-12-08

## 2020-12-07 RX ORDER — ATORVASTATIN CALCIUM 80 MG/1
40 TABLET, FILM COATED ORAL AT BEDTIME
Refills: 0 | Status: DISCONTINUED | OUTPATIENT
Start: 2020-12-07 | End: 2020-12-08

## 2020-12-07 RX ORDER — CEFAZOLIN SODIUM 1 G
2000 VIAL (EA) INJECTION EVERY 8 HOURS
Refills: 0 | Status: COMPLETED | OUTPATIENT
Start: 2020-12-07 | End: 2020-12-08

## 2020-12-07 RX ORDER — SODIUM CHLORIDE 9 MG/ML
500 INJECTION INTRAMUSCULAR; INTRAVENOUS; SUBCUTANEOUS ONCE
Refills: 0 | Status: COMPLETED | OUTPATIENT
Start: 2020-12-08 | End: 2020-12-08

## 2020-12-07 RX ORDER — OMEPRAZOLE 10 MG/1
1 CAPSULE, DELAYED RELEASE ORAL
Qty: 0 | Refills: 0 | DISCHARGE

## 2020-12-07 RX ORDER — ASPIRIN/CALCIUM CARB/MAGNESIUM 324 MG
325 TABLET ORAL
Refills: 0 | Status: DISCONTINUED | OUTPATIENT
Start: 2020-12-07 | End: 2020-12-08

## 2020-12-07 RX ADMIN — Medication 150 MILLIGRAM(S): at 12:13

## 2020-12-07 RX ADMIN — Medication 15 MILLIGRAM(S): at 18:08

## 2020-12-07 RX ADMIN — Medication 30 MILLIGRAM(S): at 18:07

## 2020-12-07 RX ADMIN — ATORVASTATIN CALCIUM 40 MILLIGRAM(S): 80 TABLET, FILM COATED ORAL at 21:22

## 2020-12-07 RX ADMIN — Medication 1000 MILLIGRAM(S): at 20:00

## 2020-12-07 RX ADMIN — FLUVOXAMINE MALEATE 25 MILLIGRAM(S): 25 TABLET ORAL at 21:22

## 2020-12-07 RX ADMIN — Medication 400 MILLIGRAM(S): at 19:19

## 2020-12-07 RX ADMIN — Medication 15 MILLIGRAM(S): at 23:03

## 2020-12-07 RX ADMIN — ARIPIPRAZOLE 5 MILLIGRAM(S): 15 TABLET ORAL at 21:22

## 2020-12-07 RX ADMIN — TRAMADOL HYDROCHLORIDE 50 MILLIGRAM(S): 50 TABLET ORAL at 12:13

## 2020-12-07 RX ADMIN — Medication 15 MILLIGRAM(S): at 23:30

## 2020-12-07 RX ADMIN — Medication 1 SPRAY(S): at 18:07

## 2020-12-07 RX ADMIN — SODIUM CHLORIDE 1000 MILLILITER(S): 9 INJECTION INTRAMUSCULAR; INTRAVENOUS; SUBCUTANEOUS at 16:49

## 2020-12-07 RX ADMIN — Medication 100 MILLIGRAM(S): at 21:22

## 2020-12-07 RX ADMIN — APREPITANT 40 MILLIGRAM(S): 80 CAPSULE ORAL at 12:11

## 2020-12-07 RX ADMIN — Medication 325 MILLIGRAM(S): at 18:08

## 2020-12-07 RX ADMIN — Medication 3 MILLIGRAM(S): at 23:03

## 2020-12-07 NOTE — PHYSICAL THERAPY INITIAL EVALUATION ADULT - PLANNED THERAPY INTERVENTIONS, PT EVAL
balance training/bed mobility training/GOAL: Pt will ascend/descend (12) steps (I) with U HR and step over step pattern in 2 weeks./strengthening/gait training/ROM/transfer training

## 2020-12-07 NOTE — PHYSICAL THERAPY INITIAL EVALUATION ADULT - GAIT DEVIATIONS NOTED, PT EVAL
decreased step length/decreased stride length/decreased richi/decreased weight-shifting ability/increased time in double stance

## 2020-12-07 NOTE — PHYSICAL THERAPY INITIAL EVALUATION ADULT - STRENGTHENING, PT EVAL
GOAL: Pt will improve bilateral LLE strength by 1/2 grade on MMT, for increased limb stability, to improve gait and facilitate stair negotiation in 2 weeks.

## 2020-12-07 NOTE — PHYSICAL THERAPY INITIAL EVALUATION ADULT - ACTIVE RANGE OF MOTION EXAMINATION, REHAB EVAL
Patient notified.   L knee flexion AROM ~90 degrees/Left LE Active ROM was WFL (within functional limits)/Right UE Active ROM was WFL (within functional limits)/Left UE Active ROM was WFL (within functional limits)/Right LE Active ROM was WFL (within functional limits)
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
## 2020-12-07 NOTE — CHART NOTE - NSCHARTNOTEFT_GEN_A_CORE
The patient is a 65 year old male who was seen in the PACU s/p left total knee replacement.  The patient was resting comfortably and reported feeling well.    Physical Exam:    Vital Signs Last 24 Hrs  T(C): 36.5   T(F): 97.7   HR: 71   BP: 144/75  RR: 14  SpO2: 99%     General: Well appearing and in no acute distress.  Extremities:  LLE: Aquacel dressing clean, dry, and intact. Sensation to light touch intact. 5/5 GC/AT.  2+ DP and PT pulses.   Intermittent pneumatic compression on both legs.  No calf tenderness bilaterally.       Assessment & Plan:    65 year old male s/p left total knee replacement.  Patient appears to be recovering well.    DVT prophylaxis: Aspirin 325 mg BID   Pain management: Acetaminophen 975 mg q 8 hrs, tramadol 50 mg q 6 hrs prn for mild pain, oxycodone 5 mg q 4 hrs prn for moderate pain, oxycodone 10 mg q 4 hrs prn for severe pain   CBC and BMP scheduled for AM   PT/OT to evaluate  Left lower extremity weight bearing as tolerated         Dispo: Floor then home after PT        KATI Avila  Orthopedic Team   Beeper [id number] The patient is a 65 year old male who was seen in the PACU s/p left total knee replacement.  The patient was resting comfortably and reported feeling well.    Physical Exam:    Vital Signs Last 24 Hrs  T(C): 36.5   T(F): 97.7   HR: 71   BP: 144/75  RR: 14  SpO2: 99%     General: Well appearing and in no acute distress.  Extremities:  LLE: Aquacel dressing clean, dry, and intact.   Sensation to light touch intact.   5/5 GC/AT.    2+ DP and PT pulses.   Intermittent pneumatic compression on both legs.  No calf tenderness bilaterally.       Assessment & Plan:    65 year old male s/p left total knee replacement.  Patient appears to be recovering well.    DVT prophylaxis: Aspirin 325 mg BID   Pain management: Acetaminophen 975 mg q 8 hrs, tramadol 50 mg q 6 hrs prn for mild pain, oxycodone 5 mg q 4 hrs prn for moderate pain, oxycodone 10 mg q 4 hrs prn for severe pain   CBC and BMP scheduled for AM   PT/OT to evaluate  Left lower extremity weight bearing as tolerated         Dispo: Floor then home after PT        KATI Avila  Orthopedic Team   Beeper [id number]

## 2020-12-07 NOTE — PHYSICAL THERAPY INITIAL EVALUATION ADULT - ADDITIONAL COMMENTS
Pt resides alone in a two family home on the second floor +1 step to enter, 12 steps to apartment with B/L HR. Was ambulating (I) without an AD and was (I) with all ADLS PTA. +walk in shower

## 2020-12-07 NOTE — PHYSICAL THERAPY INITIAL EVALUATION ADULT - RANGE OF MOTION, PT EVAL
GOAL: Pt will improve L knee flexion AROM to ~100 degrees, to allow for improved functional mobility in 2 weeks.

## 2020-12-08 ENCOUNTER — TRANSCRIPTION ENCOUNTER (OUTPATIENT)
Age: 65
End: 2020-12-08

## 2020-12-08 VITALS
RESPIRATION RATE: 19 BRPM | SYSTOLIC BLOOD PRESSURE: 118 MMHG | DIASTOLIC BLOOD PRESSURE: 70 MMHG | HEART RATE: 90 BPM | OXYGEN SATURATION: 99 % | TEMPERATURE: 98 F

## 2020-12-08 DIAGNOSIS — M25.562 PAIN IN LEFT KNEE: ICD-10-CM

## 2020-12-08 LAB
ANION GAP SERPL CALC-SCNC: 13 MMOL/L — SIGNIFICANT CHANGE UP (ref 5–17)
BUN SERPL-MCNC: 29 MG/DL — HIGH (ref 7–23)
CALCIUM SERPL-MCNC: 9.6 MG/DL — SIGNIFICANT CHANGE UP (ref 8.4–10.5)
CHLORIDE SERPL-SCNC: 105 MMOL/L — SIGNIFICANT CHANGE UP (ref 96–108)
CO2 SERPL-SCNC: 21 MMOL/L — LOW (ref 22–31)
CREAT SERPL-MCNC: 0.93 MG/DL — SIGNIFICANT CHANGE UP (ref 0.5–1.3)
GLUCOSE SERPL-MCNC: 137 MG/DL — HIGH (ref 70–99)
HCT VFR BLD CALC: 37.1 % — LOW (ref 39–50)
HGB BLD-MCNC: 12.6 G/DL — LOW (ref 13–17)
MCHC RBC-ENTMCNC: 29.4 PG — SIGNIFICANT CHANGE UP (ref 27–34)
MCHC RBC-ENTMCNC: 34 GM/DL — SIGNIFICANT CHANGE UP (ref 32–36)
MCV RBC AUTO: 86.7 FL — SIGNIFICANT CHANGE UP (ref 80–100)
NRBC # BLD: 0 /100 WBCS — SIGNIFICANT CHANGE UP (ref 0–0)
PLATELET # BLD AUTO: 263 K/UL — SIGNIFICANT CHANGE UP (ref 150–400)
POTASSIUM SERPL-MCNC: 4 MMOL/L — SIGNIFICANT CHANGE UP (ref 3.5–5.3)
POTASSIUM SERPL-SCNC: 4 MMOL/L — SIGNIFICANT CHANGE UP (ref 3.5–5.3)
RBC # BLD: 4.28 M/UL — SIGNIFICANT CHANGE UP (ref 4.2–5.8)
RBC # FLD: 13.2 % — SIGNIFICANT CHANGE UP (ref 10.3–14.5)
SODIUM SERPL-SCNC: 139 MMOL/L — SIGNIFICANT CHANGE UP (ref 135–145)
WBC # BLD: 13.73 K/UL — HIGH (ref 3.8–10.5)
WBC # FLD AUTO: 13.73 K/UL — HIGH (ref 3.8–10.5)

## 2020-12-08 PROCEDURE — U0003: CPT

## 2020-12-08 PROCEDURE — 97116 GAIT TRAINING THERAPY: CPT

## 2020-12-08 PROCEDURE — 97165 OT EVAL LOW COMPLEX 30 MIN: CPT

## 2020-12-08 PROCEDURE — 73560 X-RAY EXAM OF KNEE 1 OR 2: CPT

## 2020-12-08 PROCEDURE — 82962 GLUCOSE BLOOD TEST: CPT

## 2020-12-08 PROCEDURE — 85027 COMPLETE CBC AUTOMATED: CPT

## 2020-12-08 PROCEDURE — 80048 BASIC METABOLIC PNL TOTAL CA: CPT

## 2020-12-08 PROCEDURE — C1713: CPT

## 2020-12-08 PROCEDURE — S2900: CPT

## 2020-12-08 PROCEDURE — C1776: CPT

## 2020-12-08 PROCEDURE — 97161 PT EVAL LOW COMPLEX 20 MIN: CPT

## 2020-12-08 PROCEDURE — 94640 AIRWAY INHALATION TREATMENT: CPT

## 2020-12-08 PROCEDURE — 27447 TOTAL KNEE ARTHROPLASTY: CPT | Mod: LT

## 2020-12-08 RX ORDER — ACETAMINOPHEN 500 MG
3 TABLET ORAL
Qty: 0 | Refills: 0 | DISCHARGE
Start: 2020-12-08

## 2020-12-08 RX ORDER — SENNA PLUS 8.6 MG/1
2 TABLET ORAL
Qty: 0 | Refills: 0 | DISCHARGE
Start: 2020-12-08

## 2020-12-08 RX ORDER — IBUPROFEN 200 MG
1 TABLET ORAL
Qty: 0 | Refills: 0 | DISCHARGE

## 2020-12-08 RX ORDER — ASPIRIN/CALCIUM CARB/MAGNESIUM 324 MG
1 TABLET ORAL
Qty: 0 | Refills: 0 | DISCHARGE
Start: 2020-12-08

## 2020-12-08 RX ORDER — OXYCODONE HYDROCHLORIDE 5 MG/1
1 TABLET ORAL
Qty: 28 | Refills: 0
Start: 2020-12-08 | End: 2020-12-14

## 2020-12-08 RX ORDER — TRAMADOL HYDROCHLORIDE 50 MG/1
1 TABLET ORAL
Qty: 28 | Refills: 0
Start: 2020-12-08 | End: 2020-12-14

## 2020-12-08 RX ADMIN — PANTOPRAZOLE SODIUM 40 MILLIGRAM(S): 20 TABLET, DELAYED RELEASE ORAL at 05:55

## 2020-12-08 RX ADMIN — SODIUM CHLORIDE 1000 MILLILITER(S): 9 INJECTION INTRAMUSCULAR; INTRAVENOUS; SUBCUTANEOUS at 06:06

## 2020-12-08 RX ADMIN — Medication 1 SPRAY(S): at 05:55

## 2020-12-08 RX ADMIN — Medication 101.6 MILLIGRAM(S): at 05:55

## 2020-12-08 RX ADMIN — Medication 400 MILLIGRAM(S): at 12:30

## 2020-12-08 RX ADMIN — Medication 15 MILLIGRAM(S): at 12:45

## 2020-12-08 RX ADMIN — Medication 1000 MILLIGRAM(S): at 06:30

## 2020-12-08 RX ADMIN — Medication 15 MILLIGRAM(S): at 05:58

## 2020-12-08 RX ADMIN — LORATADINE 10 MILLIGRAM(S): 10 TABLET ORAL at 12:31

## 2020-12-08 RX ADMIN — Medication 1000 MILLIGRAM(S): at 12:45

## 2020-12-08 RX ADMIN — Medication 15 MILLIGRAM(S): at 12:30

## 2020-12-08 RX ADMIN — Medication 30 MILLIGRAM(S): at 05:56

## 2020-12-08 RX ADMIN — Medication 1 TABLET(S): at 12:30

## 2020-12-08 RX ADMIN — Medication 100 MILLIGRAM(S): at 05:54

## 2020-12-08 RX ADMIN — Medication 400 MILLIGRAM(S): at 06:04

## 2020-12-08 RX ADMIN — Medication 325 MILLIGRAM(S): at 05:55

## 2020-12-08 RX ADMIN — Medication 2000 UNIT(S): at 12:31

## 2020-12-08 RX ADMIN — OXYCODONE HYDROCHLORIDE 10 MILLIGRAM(S): 5 TABLET ORAL at 01:22

## 2020-12-08 RX ADMIN — Medication 15 MILLIGRAM(S): at 06:30

## 2020-12-08 RX ADMIN — OXYCODONE HYDROCHLORIDE 10 MILLIGRAM(S): 5 TABLET ORAL at 02:10

## 2020-12-08 NOTE — DISCHARGE NOTE PROVIDER - NSDCFUADDAPPT_GEN_ALL_CORE_FT
Follow up with your PCP within 4 weeks for general checkup/discuss recent surgery/possible medication adjustment.

## 2020-12-08 NOTE — PROGRESS NOTE ADULT - SUBJECTIVE AND OBJECTIVE BOX
Patient is a 65y old  Male who presents with a chief complaint of knee replacement (01 Dec 2020 16:37)      POST OPERATIVE DAY #: 1  Patient comfortable  No complaints    T(C): 36.6 (12-08-20 @ 05:09), Max: 36.6 (12-07-20 @ 20:46)  HR: 74 (12-08-20 @ 05:09) (59 - 84)  BP: 105/65 (12-08-20 @ 05:09) (105/65 - 170/93)  RR: 16 (12-08-20 @ 05:09) (14 - 18)  SpO2: 94% (12-08-20 @ 05:09) (93% - 100%)  Wt(kg): --    PHYSICAL EXAM:  NAD, Alert  EXT:  Lt Knee: Aquacel Dressing C/D/I  Calves soft  (+) DF/PF;  EHL FHL:  No gross Sensation deficits noted   (+) Distal Pulses;   B/L, PAS

## 2020-12-08 NOTE — DISCHARGE NOTE PROVIDER - NSDCACTIVITY_GEN_ALL_CORE
Showering allowed/Do not drive or operate machinery/Walking - Outdoors allowed/Walking - Indoors allowed/Do not make important decisions/Stairs allowed

## 2020-12-08 NOTE — DISCHARGE NOTE NURSING/CASE MANAGEMENT/SOCIAL WORK - NSTOBACCONEVERSMOKERY/N_GEN_A
Patient advised of unexpected change in provider schedule, offered 10:30am check in for procedure, patient agreeable. Procedure rescheduled same day for 10:30am check in. No

## 2020-12-08 NOTE — DISCHARGE NOTE PROVIDER - CARE PROVIDER_API CALL
Sulma Jacobson  ORTHOPAEDIC SURGERY  [address], [address]  [place] [address]  Phone: [phone]  Fax: [phone]  Follow Up Time:
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
## 2020-12-08 NOTE — OCCUPATIONAL THERAPY INITIAL EVALUATION ADULT - LIVES WITH, PROFILE
Pt lives alone in private home with 3 steps to enter, +flight to bedroom, walk in shower. Pt I in ADLs and ambulation prior to admission/alone

## 2020-12-08 NOTE — DISCHARGE NOTE PROVIDER - HOSPITAL COURSE
Patient admitted to [facility] [place] and underwent left total knee replacement without complications.  Evaluated and treated by physical therapy whom recommended home for discharge disposition.  Discharged to home when cleared by physical therapy.

## 2020-12-08 NOTE — DISCHARGE NOTE NURSING/CASE MANAGEMENT/SOCIAL WORK - PATIENT PORTAL LINK FT
You can access the FollowMyHealth Patient Portal offered by [facility] by registering at the following website: [email or web address]. By joining [facility]’s FollowMyHealth portal, you will also be able to view your health information using other applications (apps) compatible with our system.

## 2020-12-08 NOTE — OCCUPATIONAL THERAPY INITIAL EVALUATION ADULT - ADDITIONAL COMMENTS
MRI L Knee: Extensive degeneration and complex tearing of the anterior horn, body, and posterior horn of the medial meniscus. Extensive full-thickness cartilage loss in the medial femoral condyle and medial tibial plateau with small subchondral cystic changes. Small knee joint effusion. Intact ACL, PCL, medial collateral ligament, and lateral collateral ligament complex.

## 2020-12-08 NOTE — DISCHARGE NOTE PROVIDER - NSDCFUADDINST_GEN_ALL_CORE_FT
Follow up with Dr. Jacobson no sooner than postopert Follow up with Dr. Jacobson no sooner than postoperative day #14- please call to make appointment.  DVT prophylaxis: ASA 325mg oral twice daily for six weeks to prevent blood clots.  Activity: Weight Bearing as tolerated on left leg.  Dressing: Keep clean and dry. To be removed on follow up visit in Dr. Schumacher office on follow up.  Shower: No direct water to dressing. NO tub baths. Blot dry, no rubbing. Follow up with Dr. Jacobson no sooner than postoperative day #14- please call to make appointment.  DVT prophylaxis: ASA 325mg oral twice daily for six weeks to prevent blood clots.  Activity: Weight Bearing as tolerated on left leg.  Dressing: Keep clean and dry. To be removed on follow up visit in Dr. Jacobson's office on follow up.  Shower: No direct water to dressing. NO tub baths. Blot dry, no rubbing.

## 2020-12-08 NOTE — PROGRESS NOTE ADULT - ASSESSMENT
S/P Lt TKR      Plan    ck labs  OOB/PT/WBAT  analgesics as needed  d/c barry after PT today     Indy Bradley PA-C   Beeper    [id number]

## 2020-12-08 NOTE — DISCHARGE NOTE PROVIDER - NSDCMRMEDTOKEN_GEN_ALL_CORE_FT
ARIPiprazole 5 mg oral tablet: 1 tab(s) orally once a day (at bedtime)  busPIRone: 30 milligram(s) orally 2 times a day  Co Q-10: 300 milligram(s) orally once a day  fexofenadine 180 mg oral tablet: 1 tab(s) orally once a day  fluticasone 50 mcg/inh nasal spray: 1 spray(s) nasal once a day  fluvoxaMINE 25 mg oral tablet: 1 tab(s) orally once a day (at bedtime)  ibuprofen 800 mg oral tablet: 1 tab(s) orally 2 times a day  omeprazole 20 mg oral delayed release capsule: 1 cap(s) orally once a day  sildenafil 20 mg oral tablet: 1 tab(s) orally 3 times a day, As Needed  simvastatin 40 mg oral tablet: 1 tab(s) orally once a day  Vitamin D3 2000 intl units (50 mcg) oral tablet: 1 tab(s) orally once a day  Xanax XR 0.5 mg oral tablet, extended release: 1 tab(s) orally once a day, As Needed   acetaminophen 325 mg oral tablet: 3 tab(s) orally every 8 hours  ARIPiprazole 5 mg oral tablet: 1 tab(s) orally once a day (at bedtime)  aspirin 325 mg oral delayed release tablet: 1 tab(s) orally 2 times a day  busPIRone: 30 milligram(s) orally 2 times a day  Co Q-10: 300 milligram(s) orally once a day  fexofenadine 180 mg oral tablet: 1 tab(s) orally once a day  fluticasone 50 mcg/inh nasal spray: 1 spray(s) nasal once a day  fluvoxaMINE 25 mg oral tablet: 1 tab(s) orally once a day (at bedtime)  naproxen 500 mg oral tablet: 1 tab(s) orally 2 times a day MDD:2  omeprazole 20 mg oral delayed release capsule: 1 cap(s) orally once a day  oxyCODONE 5 mg oral tablet: 1-2  tab(s) orally every 6 hours, As Needed -Moderate-Severe pain MDD:8  Rolling Walker: Dx: Left total knee replacement    CRISTOFER: 99 months  senna oral tablet: 2 tab(s) orally once a day (at bedtime), As needed, Constipation  sildenafil 20 mg oral tablet: 1 tab(s) orally 3 times a day, As Needed  simvastatin 40 mg oral tablet: 1 tab(s) orally once a day  traMADol 50 mg oral tablet: 1 tab(s) orally every 6 hours, As needed, Mild Pain (1 - 3) MDD:4  Vitamin D3 2000 intl units (50 mcg) oral tablet: 1 tab(s) orally once a day  Xanax XR 0.5 mg oral tablet, extended release: 1 tab(s) orally once a day, As Needed

## 2020-12-09 PROBLEM — E66.9 OBESITY, UNSPECIFIED: Chronic | Status: ACTIVE | Noted: 2020-12-01

## 2020-12-09 PROBLEM — Z87.09 PERSONAL HISTORY OF OTHER DISEASES OF THE RESPIRATORY SYSTEM: Chronic | Status: ACTIVE | Noted: 2020-12-01

## 2020-12-09 PROBLEM — M25.511 PAIN IN RIGHT SHOULDER: Chronic | Status: ACTIVE | Noted: 2020-12-01
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
## 2020-12-09 PROBLEM — F41.9 ANXIETY DISORDER, UNSPECIFIED: Chronic | Status: ACTIVE | Noted: 2020-12-01

## 2020-12-09 PROBLEM — E78.5 HYPERLIPIDEMIA, UNSPECIFIED: Chronic | Status: ACTIVE | Noted: 2020-12-01

## 2020-12-09 PROBLEM — M25.562 PAIN IN LEFT KNEE: Chronic | Status: ACTIVE | Noted: 2020-12-01

## 2020-12-09 PROBLEM — M17.12 UNILATERAL PRIMARY OSTEOARTHRITIS, LEFT KNEE: Chronic | Status: ACTIVE | Noted: 2020-12-01

## 2020-12-09 PROBLEM — J30.2 OTHER SEASONAL ALLERGIC RHINITIS: Chronic | Status: ACTIVE | Noted: 2020-12-01

## 2020-12-17 PROBLEM — Z96.641 S/P HIP REPLACEMENT, RIGHT: Status: ACTIVE | Noted: 2020-12-17

## 2020-12-17 RX ORDER — DICLOFENAC SODIUM 50 MG/1
50 TABLET, DELAYED RELEASE ORAL
Qty: 20 | Refills: 0 | Status: ACTIVE | COMMUNITY
Start: 2020-12-17 | End: 1900-01-01

## 2020-12-23 ENCOUNTER — APPOINTMENT (OUTPATIENT)
Dept: ORTHOPEDIC SURGERY | Facility: CLINIC | Age: 65
End: 2020-12-23
Payer: MEDICARE

## 2020-12-23 PROCEDURE — 73560 X-RAY EXAM OF KNEE 1 OR 2: CPT | Mod: LT

## 2020-12-23 PROCEDURE — 99024 POSTOP FOLLOW-UP VISIT: CPT

## 2021-03-07 ENCOUNTER — FORM ENCOUNTER (OUTPATIENT)
Age: 66
End: 2021-03-07

## 2021-03-25 ENCOUNTER — APPOINTMENT (OUTPATIENT)
Dept: ORTHOPEDIC SURGERY | Facility: CLINIC | Age: 66
End: 2021-03-25
Payer: MEDICARE

## 2021-03-25 PROCEDURE — 73560 X-RAY EXAM OF KNEE 1 OR 2: CPT | Mod: LT

## 2021-03-25 PROCEDURE — 99214 OFFICE O/P EST MOD 30 MIN: CPT

## 2021-03-25 RX ORDER — CELECOXIB 100 MG/1
100 CAPSULE ORAL TWICE DAILY
Qty: 28 | Refills: 0 | Status: ACTIVE | COMMUNITY
Start: 2021-03-25 | End: 1900-01-01

## 2021-06-03 ENCOUNTER — APPOINTMENT (OUTPATIENT)
Dept: ORTHOPEDIC SURGERY | Facility: CLINIC | Age: 66
End: 2021-06-03
Payer: MEDICARE

## 2021-06-03 VITALS — WEIGHT: 207 LBS | BODY MASS INDEX: 31.37 KG/M2 | HEIGHT: 68 IN

## 2021-06-03 PROCEDURE — 73560 X-RAY EXAM OF KNEE 1 OR 2: CPT | Mod: LT

## 2021-06-03 PROCEDURE — 99214 OFFICE O/P EST MOD 30 MIN: CPT

## 2021-07-09 NOTE — H&P PST ADULT - PRO PAIN EXPRESSION
Procedure:  RELEASE LEFT DEQUERVAINS (Left Hand)    Relevant Problems   ANESTHESIA  old and current charts reviewed      CARDIO  ECHO normal    (+) Hyperlipidemia   (+) Hypertension      ENDO  A1c 5 7      GYN  PM      MUSCULOSKELETAL   (+) Low back pain   (+) Muscle spasms of neck   (+) Osteoarthrosis      NEURO/PSYCH   (+) CVA (cerebral vascular accident) ([address] )   (+) Chronic right shoulder pain      PULMONARY   (-) Sleep apnea   (-) Smoking   (-) URI (upper respiratory infection)        Physical Exam    Airway       Dental       Cardiovascular  Cardiovascular exam normal    Pulmonary  Pulmonary exam normal     Other Findings        Anesthesia Plan  ASA Score- 2     Anesthesia Type- IV sedation with anesthesia with ASA Monitors  Additional Monitors:   Airway Plan:           Plan Factors-Exercise tolerance (METS): >4 METS  Chart reviewed  EKG reviewed  Imaging results reviewed  Existing labs reviewed  Patient summary reviewed  Patient is not a current smoker  Patient not instructed to abstain from smoking on day of procedure  Patient did not smoke on day of surgery  Obstructive sleep apnea risk education given perioperatively  Induction- intravenous  Postoperative Plan- Plan for postoperative opioid use  Informed Consent- Anesthetic plan and risks discussed with patient  I personally reviewed this patient with the CRNA  Discussed and agreed on the Anesthesia Plan with the CRNA  Anuradha Peres           Procedure:  RELEASE LEFT DEQUERVAINS (Left Hand)    Relevant Problems   ANESTHESIA  old and current charts reviewed      CARDIO  ECHO normal    (+) Hyperlipidemia   (+) Hypertension      ENDO  A1c 5 7      GYN  PM      MUSCULOSKELETAL   (+) Low back pain   (+) Muscle spasms of neck   (+) Osteoarthrosis      NEURO/PSYCH   (+) CVA (cerebral vascular accident) ([address] )   (+) Chronic right shoulder pain      PULMONARY   (-) Sleep apnea   (-) Smoking   (-) URI (upper respiratory infection)        Physical Exam    Airway    Mallampati score: II  TM Distance: >3 FB  Neck ROM: full     Dental       Cardiovascular  Cardiovascular exam normal    Pulmonary  Pulmonary exam normal     Other Findings        Anesthesia Plan  ASA Score- 2     Anesthesia Type- IV sedation with anesthesia with ASA Monitors  Additional Monitors:   Airway Plan:           Plan Factors-Exercise tolerance (METS): >4 METS  Chart reviewed  EKG reviewed  Imaging results reviewed  Existing labs reviewed  Patient summary reviewed  Patient is not a current smoker  Patient not instructed to abstain from smoking on day of procedure  Patient did not smoke on day of surgery  Obstructive sleep apnea risk education given perioperatively  Induction- intravenous  Postoperative Plan- Plan for postoperative opioid use  Informed Consent- Anesthetic plan and risks discussed with patient  I personally reviewed this patient with the CRNA  Discussed and agreed on the Anesthesia Plan with the CRNA  Janie Alaniz none

## 2022-01-19 ENCOUNTER — NON-APPOINTMENT (OUTPATIENT)
Age: 67
End: 2022-01-19

## 2022-02-03 ENCOUNTER — APPOINTMENT (OUTPATIENT)
Dept: ORTHOPEDIC SURGERY | Facility: CLINIC | Age: 67
End: 2022-02-03
Payer: MEDICARE

## 2022-02-03 VITALS — WEIGHT: 210 LBS | HEIGHT: 68 IN | BODY MASS INDEX: 31.83 KG/M2

## 2022-02-03 VITALS — HEART RATE: 78 BPM | SYSTOLIC BLOOD PRESSURE: 124 MMHG | DIASTOLIC BLOOD PRESSURE: 72 MMHG

## 2022-02-03 DIAGNOSIS — Z96.652 PRESENCE OF LEFT ARTIFICIAL KNEE JOINT: ICD-10-CM

## 2022-02-03 PROCEDURE — 99214 OFFICE O/P EST MOD 30 MIN: CPT

## 2022-02-03 PROCEDURE — 73562 X-RAY EXAM OF KNEE 3: CPT | Mod: LT

## 2022-02-04 PROBLEM — Z96.652 STATUS POST LEFT KNEE REPLACEMENT: Status: ACTIVE | Noted: 2020-12-23

## 2022-02-04 NOTE — PHYSICAL EXAM
[Antalgic] : antalgic [Normal RUE] : Right Upper Extremity: No scars, rashes, lesions, ulcers, skin intact [Normal Finger/nose] : finger to nose coordination [Normal Touch] : sensation intact for touch [Obese] : obese [Normal] : no peripheral adenopathy appreciated [Poor Appearance] : well-appearing [Acute Distress] : not in acute distress [de-identified] : Patient appears stated age in no acute respiratory distress. Patient is alert oriented x3. Patient has normal mood and affect. \par Bilateral knee exam\par Range of motion of the knee is 0-120°. \par Skin is normal.  No rash.\par There is no effusion. No medial or lateral joint line tenderness. No swelling, no pitting edema.\par Overall alignment of the knee is then slight varus. Good anterior posterior stability. Firm endpoints on anterior and posterior drawer. \par Medial lateral stability is intact. Firm endpoint on medial and lateral stress testing .\par Dylan test is negative.\par Quadriceps strength 5/ 5. There is no loss of muscle volume in the thigh. \par Good anterior posterior and mediolateral stability.\par Sensation in the extremities intact. \par Discrimination is intact. Good DP and PT pulses.\par 		There is mild effusion of the left knee\par Bilateral hip exam\par On inspection of the hip shows skin is normal. No evidence of rash. \par No loss of muscle.  Abductor strength is 5 out of 5. Hip flexor strength is 5.\par Range of motion of the hip at 90° flexion internal rotation is 15° external rotation is 30° pain-free. \par Hip has good stability in anterior and posterior direction. \par On lateral decubitus  examination there is no tenderness in the greater trochanter. \par Lower Extremity Examination \par Bilateral lower extremity skin is normal. There is no rash. There is no edema and lymphadenopathy.  DP and PT pulses intact. Sensation is intact. [de-identified] : X-rays of the left knee 3 view shows knee replacement in good position.  No evidence of component loosening

## 2022-02-04 NOTE — HISTORY OF PRESENT ILLNESS
[8] : a maximum pain level of 8/10 [Lifting] : lifting [Knee Flexion] : worsened with knee flexion [Exercise Regimen] : relieved by exercise regimen [Physical Therapy] : relieved by physical therapy [Rest] : relieved by rest [de-identified] : Patient presents to office walking on his own 6 months s/p left TKR, reports decreased ROM and excruciating pain when bending knee as well as weird noises coming out of his knee x 3 weeks ago. denies trauma or any accident. \par Patient indicated pain to posterior aspect of knee, 6/10, intermittent, achy in nature. Worsening with walking or bending. Poorly controlled with medication. \par Patient notices progressive improvement as time goes by, resumed all adl's and tolerating physical activity with  symptoms limitations. \par Patient denies any fever, chills, swelling, erythema, wound discharge, calf tenderness or redness,  hematomas, numbness or tingling sensation, instability or buckling. \par \par  [Worsening] : worsening [___ mths] : [unfilled] month(s) ago [7] : a current pain level of 7/10 [4] : a minimum pain level of 4/10 [Intermit.] : ~He/She~ states the symptoms seem to be intermittent [Bending] : worsened by bending [Sitting] : worsened by sitting [Running] : worsened by running [Walking] : worsened by walking [Knee Extension] : worsened with knee extension [Acetaminophen] : not relieved by acetaminophen [NSAIDs] : relieved by nonsteroidal anti-inflammatory drugs

## 2022-02-04 NOTE — DISCUSSION/SUMMARY
[de-identified] : At this time patient has a left status post left knee replacement doing well.  Continue daily as tolerated range of motion strengthening he has some mild stiffness continue to do steroid strengthening follow-up in 1 year

## 2022-11-28 NOTE — PHYSICAL THERAPY INITIAL EVALUATION ADULT - ASSISTIVE DEVICE FOR TRANSFER: GAIT, REHAB EVAL
Original pharmacy out of medication, mom requesting refill be sent to new pharmacy    Vyvanse 30 mg Chewable    Marivel Puckett last med check 10/20/22 Dr. Damian    Allergies: milk containing products        rolling walker

## 2023-09-15 ASSESSMENT — KOOS JR
KOOS JR RAW SCORE: 14
BENDING TO THE FLOOR TO PICK UP OBJECT: MILD
GOING UP OR DOWN STAIRS: EXTREME
STANDING UPRIGHT: MILD
STRAIGHTENING KNEE FULLY: MODERATE
TWISING OR PIVOTING ON KNEE: MODERATE
IMPORTED KOOS JR SCORE: 57.14
KOOS JR RAW SCORE: 9
KOOS JR RAW SCORE: 12
STANDING UPRIGHT: MODERATE
IMPORTED FORM: YES
RISING FROM SITTING: MILD
IMPORTED KOOS JR SCORE: 59.38
IMPORTED KOOS JR SCORE: 52.46
STRAIGHTENING KNEE FULLY: MILD
RISING FROM SITTING: MODERATE
GOING UP OR DOWN STAIRS: SEVERE
KOOS JR RAW SCORE: 13
IMPORTED LATERALITY: LEFT
IMPORTED KOOS JR SCORE: 54.84
HOW SEVERE IS YOUR KNEE STIFFNESS AFTER FIRST WAKING IN MORNING: MILD
GOING UP OR DOWN STAIRS: MODERATE
BENDING TO THE FLOOR TO PICK UP OBJECT: MODERATE
IMPORTED KOOS JR SCORE: 63.78
HOW SEVERE IS YOUR KNEE STIFFNESS AFTER FIRST WAKING IN MORNING: MODERATE
KOOS JR RAW SCORE: 11

## 2024-10-15 NOTE — HEALTH RISK ASSESSMENT
[No falls in past year] : Patient reported no falls in the past year [1] : 2) Feeling down, depressed, or hopeless for several days (1) [] : No 79